# Patient Record
Sex: FEMALE | Race: WHITE | NOT HISPANIC OR LATINO | Employment: OTHER | URBAN - METROPOLITAN AREA
[De-identification: names, ages, dates, MRNs, and addresses within clinical notes are randomized per-mention and may not be internally consistent; named-entity substitution may affect disease eponyms.]

---

## 2018-03-08 ENCOUNTER — HOSPITAL ENCOUNTER (OUTPATIENT)
Dept: GENERAL RADIOLOGY | Facility: HOSPITAL | Age: 58
Discharge: HOME | End: 2018-03-08
Attending: ORTHOPAEDIC SURGERY | Admitting: ORTHOPAEDIC SURGERY

## 2018-03-08 ENCOUNTER — TRANSCRIBE ORDERS (OUTPATIENT)
Dept: ADMINISTRATIVE | Facility: HOSPITAL | Age: 58
End: 2018-03-08

## 2018-03-08 ENCOUNTER — LAB (OUTPATIENT)
Dept: LAB | Facility: HOSPITAL | Age: 58
End: 2018-03-08

## 2018-03-08 ENCOUNTER — TRANSCRIBE ORDERS (OUTPATIENT)
Dept: LAB | Facility: HOSPITAL | Age: 58
End: 2018-03-08

## 2018-03-08 DIAGNOSIS — R73.09 OTHER ABNORMAL GLUCOSE: ICD-10-CM

## 2018-03-08 DIAGNOSIS — Z87.68 PERSONAL HISTORY OF PERINATAL PROBLEMS: Primary | ICD-10-CM

## 2018-03-08 DIAGNOSIS — Z01.811 PRE-OP CHEST EXAM: ICD-10-CM

## 2018-03-08 DIAGNOSIS — Z01.811 PRE-OP CHEST EXAM: Primary | ICD-10-CM

## 2018-03-08 DIAGNOSIS — Z01.818 PREOP EXAMINATION: ICD-10-CM

## 2018-03-08 DIAGNOSIS — Z87.68 PERSONAL HISTORY OF PERINATAL PROBLEMS: ICD-10-CM

## 2018-03-08 LAB
ANION GAP SERPL CALCULATED.3IONS-SCNC: 4 MMOL/L (ref 3–11)
BUN BLD-MCNC: 24 MG/DL (ref 9–23)
BUN/CREAT SERPL: 26.7 (ref 7–25)
CALCIUM SPEC-SCNC: 9.3 MG/DL (ref 8.7–10.4)
CHLORIDE SERPL-SCNC: 110 MMOL/L (ref 99–109)
CO2 SERPL-SCNC: 27 MMOL/L (ref 20–31)
CREAT BLD-MCNC: 0.9 MG/DL (ref 0.6–1.3)
DEPRECATED RDW RBC AUTO: 48.8 FL (ref 37–54)
ERYTHROCYTE [DISTWIDTH] IN BLOOD BY AUTOMATED COUNT: 13.8 % (ref 11.3–14.5)
GFR SERPL CREATININE-BSD FRML MDRD: 64 ML/MIN/1.73
GLUCOSE BLD-MCNC: 99 MG/DL (ref 70–100)
HBA1C MFR BLD: 5.7 % (ref 4.8–5.6)
HCT VFR BLD AUTO: 41.3 % (ref 34.5–44)
HGB BLD-MCNC: 13.4 G/DL (ref 11.5–15.5)
MCH RBC QN AUTO: 31.3 PG (ref 27–31)
MCHC RBC AUTO-ENTMCNC: 32.4 G/DL (ref 32–36)
MCV RBC AUTO: 96.5 FL (ref 80–99)
PLATELET # BLD AUTO: 237 10*3/MM3 (ref 150–450)
PMV BLD AUTO: 11.1 FL (ref 6–12)
POTASSIUM BLD-SCNC: 4.7 MMOL/L (ref 3.5–5.5)
RBC # BLD AUTO: 4.28 10*6/MM3 (ref 3.89–5.14)
SODIUM BLD-SCNC: 141 MMOL/L (ref 132–146)
WBC NRBC COR # BLD: 6.34 10*3/MM3 (ref 3.5–10.8)

## 2018-03-08 PROCEDURE — 93010 ELECTROCARDIOGRAM REPORT: CPT | Performed by: INTERNAL MEDICINE

## 2018-03-08 PROCEDURE — 36415 COLL VENOUS BLD VENIPUNCTURE: CPT

## 2018-03-08 PROCEDURE — 85027 COMPLETE CBC AUTOMATED: CPT

## 2018-03-08 PROCEDURE — 93005 ELECTROCARDIOGRAM TRACING: CPT

## 2018-03-08 PROCEDURE — 83036 HEMOGLOBIN GLYCOSYLATED A1C: CPT

## 2018-03-08 PROCEDURE — 71046 X-RAY EXAM CHEST 2 VIEWS: CPT

## 2018-03-08 PROCEDURE — 80048 BASIC METABOLIC PNL TOTAL CA: CPT

## 2019-02-28 ENCOUNTER — TRANSCRIBE ORDERS (OUTPATIENT)
Dept: LAB | Facility: HOSPITAL | Age: 59
End: 2019-02-28

## 2019-02-28 ENCOUNTER — LAB (OUTPATIENT)
Dept: LAB | Facility: HOSPITAL | Age: 59
End: 2019-02-28

## 2019-02-28 DIAGNOSIS — R61 GENERALIZED HYPERHIDROSIS: Primary | ICD-10-CM

## 2019-02-28 DIAGNOSIS — R61 GENERALIZED HYPERHIDROSIS: ICD-10-CM

## 2019-02-28 LAB — TSH SERPL DL<=0.05 MIU/L-ACNC: 1.15 MIU/ML (ref 0.35–5.35)

## 2019-02-28 PROCEDURE — 36415 COLL VENOUS BLD VENIPUNCTURE: CPT

## 2019-02-28 PROCEDURE — 84443 ASSAY THYROID STIM HORMONE: CPT

## 2019-06-06 ENCOUNTER — OFFICE VISIT (OUTPATIENT)
Dept: PHYSICAL THERAPY | Facility: CLINIC | Age: 59
End: 2019-06-06

## 2019-06-06 ENCOUNTER — TRANSCRIBE ORDERS (OUTPATIENT)
Dept: PHYSICAL THERAPY | Facility: CLINIC | Age: 59
End: 2019-06-06

## 2019-06-06 DIAGNOSIS — M25.532 LEFT WRIST PAIN: Primary | ICD-10-CM

## 2019-06-06 DIAGNOSIS — M65.4 DE QUERVAIN'S SYNDROME (TENOSYNOVITIS): Primary | ICD-10-CM

## 2019-06-06 DIAGNOSIS — M65.4 TENOSYNOVITIS, DE QUERVAIN: ICD-10-CM

## 2019-06-06 PROCEDURE — L3906 WHO W/O JOINTS CF: HCPCS | Performed by: PHYSICAL THERAPIST

## 2019-06-10 NOTE — PROGRESS NOTES
Yasmin Quintin 1960   Diagnosis/ Surgery: Left DeAndrew's              Date Of Injury: Chronic    Date Of Surgery:injection today     Hand Dominance: right  History of Present Condition: Pt states that she has been dealing with pain at the base of the thumb for about 1 year. It started to get worse in April   Medical/Vocational History/ Medications: pt is retired - does gardening, working out, and housework     Pain: 7-8/10    Edema: moderate  Sensibility: WNL   Wound Status:NA  ROM/ Strength/Test: NT    Splinting:  · Patient was measure and fit with a custom fabricated forearm based thumb spica   · Patient was instructed in wearing schedule, precautions and care of the splint during this visit.   · Patient was instructed in proper donning/doffing of splint.   Assessment:  · Patient was fitted and appropriate splint was fabricated this date.  · Patient reported that splint was comfortable and had no complications with the fit of the splint.  · Patient was instructed and patient verbalized understanding of precautions, wear and care of the splint.   · Patient demonstrated independent donning/doffing of splint during treatment today.  Goals:  · Patient was fitted properly with appropriate splint for diagnosis  · Patient was educated on precautions, wear schedule and care of splint  · Patient demonstrated independence with donning/doffing of the splint.  · Splint was provided to Protect Healing Structures, Restrict Mobility, Improve joint alignment.  Plan:  · No additional treatment is required for this patient at this time. The patient is therefore discharged from therapy.  · Patient advised to contact therapist with any additional questions or concerns regarding the fit and function of the splint.  · Patient will be seen for splint issues as needed   · Wear Instructions: Off for hygiene       PT SIGNATURE: Nitin Gorman, PT, DPT,OCS, Cert DN  DATE TREATMENT INITIATED: 6/10/2019    Physician  Signature____________________________________ Date____________

## 2019-11-21 ENCOUNTER — TRANSCRIBE ORDERS (OUTPATIENT)
Dept: LAB | Facility: HOSPITAL | Age: 59
End: 2019-11-21

## 2019-11-21 ENCOUNTER — HOSPITAL ENCOUNTER (OUTPATIENT)
Dept: GENERAL RADIOLOGY | Facility: HOSPITAL | Age: 59
Discharge: HOME | End: 2019-11-21
Admitting: ORTHOPAEDIC SURGERY

## 2019-11-21 ENCOUNTER — APPOINTMENT (OUTPATIENT)
Dept: LAB | Facility: HOSPITAL | Age: 59
End: 2019-11-21

## 2019-11-21 ENCOUNTER — TRANSCRIBE ORDERS (OUTPATIENT)
Dept: ADMINISTRATIVE | Facility: HOSPITAL | Age: 59
End: 2019-11-21

## 2019-11-21 DIAGNOSIS — R73.09 IMPAIRED GLUCOSE TOLERANCE TEST: ICD-10-CM

## 2019-11-21 DIAGNOSIS — Z01.818 OTHER SPECIFIED PRE-OPERATIVE EXAMINATION: ICD-10-CM

## 2019-11-21 DIAGNOSIS — Z87.898 PERSONAL HISTORY OF OTHER LYMPHATIC AND HEMATOPOIETIC NEOPLASM: Primary | ICD-10-CM

## 2019-11-21 DIAGNOSIS — Z01.811 PRE-OP CHEST EXAM: Primary | ICD-10-CM

## 2019-11-21 DIAGNOSIS — Z01.811 PRE-OP CHEST EXAM: ICD-10-CM

## 2019-11-21 LAB
ANION GAP SERPL CALCULATED.3IONS-SCNC: 13 MMOL/L (ref 5–15)
BILIRUB UR QL STRIP: NEGATIVE
BUN BLD-MCNC: 15 MG/DL (ref 8–23)
BUN/CREAT SERPL: 16.5 (ref 7–25)
CALCIUM SPEC-SCNC: 9.6 MG/DL (ref 8.6–10.5)
CHLORIDE SERPL-SCNC: 99 MMOL/L (ref 98–107)
CLARITY UR: CLEAR
CO2 SERPL-SCNC: 24 MMOL/L (ref 22–29)
COLOR UR: YELLOW
CREAT BLD-MCNC: 0.91 MG/DL (ref 0.57–1)
DEPRECATED RDW RBC AUTO: 47.6 FL (ref 37–54)
ERYTHROCYTE [DISTWIDTH] IN BLOOD BY AUTOMATED COUNT: 12.5 % (ref 12.3–15.4)
ERYTHROCYTE [SEDIMENTATION RATE] IN BLOOD: 14 MM/HR (ref 0–30)
GFR SERPL CREATININE-BSD FRML MDRD: 63 ML/MIN/1.73
GLUCOSE BLD-MCNC: 97 MG/DL (ref 65–99)
GLUCOSE UR STRIP-MCNC: NEGATIVE MG/DL
HBA1C MFR BLD: 5.9 % (ref 4.8–5.6)
HCT VFR BLD AUTO: 44.2 % (ref 34–46.6)
HGB BLD-MCNC: 14.6 G/DL (ref 12–15.9)
HGB UR QL STRIP.AUTO: NEGATIVE
KETONES UR QL STRIP: NEGATIVE
LEUKOCYTE ESTERASE UR QL STRIP.AUTO: NEGATIVE
MCH RBC QN AUTO: 33.8 PG (ref 26.6–33)
MCHC RBC AUTO-ENTMCNC: 33 G/DL (ref 31.5–35.7)
MCV RBC AUTO: 102.3 FL (ref 79–97)
NITRITE UR QL STRIP: NEGATIVE
PH UR STRIP.AUTO: 5.5 [PH] (ref 5–8)
PLATELET # BLD AUTO: 273 10*3/MM3 (ref 140–450)
PMV BLD AUTO: 10.3 FL (ref 6–12)
POTASSIUM BLD-SCNC: 4.8 MMOL/L (ref 3.5–5.2)
PROT UR QL STRIP: NEGATIVE
RBC # BLD AUTO: 4.32 10*6/MM3 (ref 3.77–5.28)
SODIUM BLD-SCNC: 136 MMOL/L (ref 136–145)
SP GR UR STRIP: 1.01 (ref 1–1.03)
UROBILINOGEN UR QL STRIP: NORMAL
WBC NRBC COR # BLD: 12.14 10*3/MM3 (ref 3.4–10.8)

## 2019-11-21 PROCEDURE — 36415 COLL VENOUS BLD VENIPUNCTURE: CPT | Performed by: ORTHOPAEDIC SURGERY

## 2019-11-21 PROCEDURE — 85027 COMPLETE CBC AUTOMATED: CPT | Performed by: ORTHOPAEDIC SURGERY

## 2019-11-21 PROCEDURE — 83036 HEMOGLOBIN GLYCOSYLATED A1C: CPT | Performed by: ORTHOPAEDIC SURGERY

## 2019-11-21 PROCEDURE — 93005 ELECTROCARDIOGRAM TRACING: CPT | Performed by: ORTHOPAEDIC SURGERY

## 2019-11-21 PROCEDURE — 85652 RBC SED RATE AUTOMATED: CPT | Performed by: ORTHOPAEDIC SURGERY

## 2019-11-21 PROCEDURE — 93010 ELECTROCARDIOGRAM REPORT: CPT | Performed by: INTERNAL MEDICINE

## 2019-11-21 PROCEDURE — 81003 URINALYSIS AUTO W/O SCOPE: CPT | Performed by: ORTHOPAEDIC SURGERY

## 2019-11-21 PROCEDURE — 80048 BASIC METABOLIC PNL TOTAL CA: CPT | Performed by: ORTHOPAEDIC SURGERY

## 2019-11-21 PROCEDURE — 71046 X-RAY EXAM CHEST 2 VIEWS: CPT

## 2019-12-05 ENCOUNTER — APPOINTMENT (OUTPATIENT)
Dept: PREADMISSION TESTING | Facility: HOSPITAL | Age: 59
End: 2019-12-05

## 2019-12-05 VITALS — HEIGHT: 68 IN | WEIGHT: 182.2 LBS | BODY MASS INDEX: 27.61 KG/M2

## 2019-12-05 LAB
ABO GROUP BLD: NORMAL
ALBUMIN SERPL-MCNC: 4.7 G/DL (ref 3.5–5.2)
ALBUMIN/GLOB SERPL: 1.6 G/DL
ALP SERPL-CCNC: 86 U/L (ref 39–117)
ALT SERPL W P-5'-P-CCNC: 19 U/L (ref 1–33)
ANION GAP SERPL CALCULATED.3IONS-SCNC: 13 MMOL/L (ref 5–15)
AST SERPL-CCNC: 24 U/L (ref 1–32)
BACTERIA UR QL AUTO: NORMAL /HPF
BASOPHILS # BLD AUTO: 0.04 10*3/MM3 (ref 0–0.2)
BASOPHILS NFR BLD AUTO: 0.4 % (ref 0–1.5)
BILIRUB SERPL-MCNC: 0.5 MG/DL (ref 0.2–1.2)
BILIRUB UR QL STRIP: NEGATIVE
BLD GP AB SCN SERPL QL: NEGATIVE
BUN BLD-MCNC: 18 MG/DL (ref 8–23)
BUN/CREAT SERPL: 19.6 (ref 7–25)
CALCIUM SPEC-SCNC: 9.6 MG/DL (ref 8.6–10.5)
CHLORIDE SERPL-SCNC: 99 MMOL/L (ref 98–107)
CLARITY UR: CLEAR
CO2 SERPL-SCNC: 24 MMOL/L (ref 22–29)
COLOR UR: YELLOW
CREAT BLD-MCNC: 0.92 MG/DL (ref 0.57–1)
CRP SERPL-MCNC: 1.1 MG/DL (ref 0–0.5)
DEPRECATED RDW RBC AUTO: 48.1 FL (ref 37–54)
EOSINOPHIL # BLD AUTO: 0.05 10*3/MM3 (ref 0–0.4)
EOSINOPHIL NFR BLD AUTO: 0.5 % (ref 0.3–6.2)
ERYTHROCYTE [DISTWIDTH] IN BLOOD BY AUTOMATED COUNT: 12.8 % (ref 12.3–15.4)
ERYTHROCYTE [SEDIMENTATION RATE] IN BLOOD: 22 MM/HR (ref 0–30)
GFR SERPL CREATININE-BSD FRML MDRD: 62 ML/MIN/1.73
GLOBULIN UR ELPH-MCNC: 3 GM/DL
GLUCOSE BLD-MCNC: 71 MG/DL (ref 65–99)
GLUCOSE UR STRIP-MCNC: NEGATIVE MG/DL
HCT VFR BLD AUTO: 45.3 % (ref 34–46.6)
HGB BLD-MCNC: 14.8 G/DL (ref 12–15.9)
HGB UR QL STRIP.AUTO: NEGATIVE
HYALINE CASTS UR QL AUTO: NORMAL /LPF
IMM GRANULOCYTES # BLD AUTO: 0.04 10*3/MM3 (ref 0–0.05)
IMM GRANULOCYTES NFR BLD AUTO: 0.4 % (ref 0–0.5)
KETONES UR QL STRIP: ABNORMAL
LEUKOCYTE ESTERASE UR QL STRIP.AUTO: NEGATIVE
LYMPHOCYTES # BLD AUTO: 2.57 10*3/MM3 (ref 0.7–3.1)
LYMPHOCYTES NFR BLD AUTO: 25.4 % (ref 19.6–45.3)
MCH RBC QN AUTO: 33.2 PG (ref 26.6–33)
MCHC RBC AUTO-ENTMCNC: 32.7 G/DL (ref 31.5–35.7)
MCV RBC AUTO: 101.6 FL (ref 79–97)
MONOCYTES # BLD AUTO: 0.76 10*3/MM3 (ref 0.1–0.9)
MONOCYTES NFR BLD AUTO: 7.5 % (ref 5–12)
NEUTROPHILS # BLD AUTO: 6.66 10*3/MM3 (ref 1.7–7)
NEUTROPHILS NFR BLD AUTO: 65.8 % (ref 42.7–76)
NITRITE UR QL STRIP: NEGATIVE
NRBC BLD AUTO-RTO: 0 /100 WBC (ref 0–0.2)
PH UR STRIP.AUTO: 5.5 [PH] (ref 5–8)
PLATELET # BLD AUTO: 278 10*3/MM3 (ref 140–450)
PMV BLD AUTO: 9.9 FL (ref 6–12)
POTASSIUM BLD-SCNC: 4.1 MMOL/L (ref 3.5–5.2)
PROT SERPL-MCNC: 7.7 G/DL (ref 6–8.5)
PROT UR QL STRIP: NEGATIVE
RBC # BLD AUTO: 4.46 10*6/MM3 (ref 3.77–5.28)
RBC # UR: NORMAL /HPF
REF LAB TEST METHOD: NORMAL
RH BLD: POSITIVE
SODIUM BLD-SCNC: 136 MMOL/L (ref 136–145)
SP GR UR STRIP: 1.03 (ref 1–1.03)
SQUAMOUS #/AREA URNS HPF: NORMAL /HPF
UROBILINOGEN UR QL STRIP: ABNORMAL
WBC NRBC COR # BLD: 10.12 10*3/MM3 (ref 3.4–10.8)
WBC UR QL AUTO: NORMAL /HPF

## 2019-12-05 PROCEDURE — 85652 RBC SED RATE AUTOMATED: CPT | Performed by: ORTHOPAEDIC SURGERY

## 2019-12-05 PROCEDURE — 36415 COLL VENOUS BLD VENIPUNCTURE: CPT

## 2019-12-05 PROCEDURE — 80053 COMPREHEN METABOLIC PANEL: CPT | Performed by: ORTHOPAEDIC SURGERY

## 2019-12-05 PROCEDURE — 86850 RBC ANTIBODY SCREEN: CPT | Performed by: ORTHOPAEDIC SURGERY

## 2019-12-05 PROCEDURE — 85025 COMPLETE CBC W/AUTO DIFF WBC: CPT | Performed by: ORTHOPAEDIC SURGERY

## 2019-12-05 PROCEDURE — 86901 BLOOD TYPING SEROLOGIC RH(D): CPT | Performed by: ORTHOPAEDIC SURGERY

## 2019-12-05 PROCEDURE — 86140 C-REACTIVE PROTEIN: CPT | Performed by: ORTHOPAEDIC SURGERY

## 2019-12-05 PROCEDURE — 81001 URINALYSIS AUTO W/SCOPE: CPT | Performed by: ORTHOPAEDIC SURGERY

## 2019-12-05 PROCEDURE — 86900 BLOOD TYPING SEROLOGIC ABO: CPT | Performed by: ORTHOPAEDIC SURGERY

## 2019-12-05 RX ORDER — NORETHINDRONE ACETATE AND ETHINYL ESTRADIOL 1; 5 MG/1; UG/1
1 TABLET ORAL DAILY
COMMUNITY

## 2019-12-05 RX ORDER — TIZANIDINE 4 MG/1
4 TABLET ORAL NIGHTLY PRN
COMMUNITY

## 2019-12-05 RX ORDER — TRAZODONE HYDROCHLORIDE 100 MG/1
100 TABLET ORAL NIGHTLY
COMMUNITY

## 2019-12-05 RX ORDER — LANOLIN ALCOHOL/MO/W.PET/CERES
3 CREAM (GRAM) TOPICAL NIGHTLY PRN
COMMUNITY

## 2019-12-05 RX ORDER — ESCITALOPRAM OXALATE 10 MG/1
10 TABLET ORAL EVERY MORNING
COMMUNITY

## 2019-12-05 RX ORDER — PHENOL 1.4 %
600 AEROSOL, SPRAY (ML) MUCOUS MEMBRANE DAILY
COMMUNITY

## 2019-12-05 RX ORDER — OMEPRAZOLE 40 MG/1
40 CAPSULE, DELAYED RELEASE ORAL EVERY OTHER DAY
COMMUNITY

## 2019-12-05 RX ORDER — VARENICLINE TARTRATE 1 MG/1
1 TABLET, FILM COATED ORAL 2 TIMES DAILY
COMMUNITY

## 2019-12-05 ASSESSMENT — HOOS JR
HOOS JR SCORE: 43.335
HOOS JR SCORE: 15

## 2019-12-05 NOTE — PAT
Patient instructed to drink 20 ounces (or until full) of Gatorade and it needs to be completed 1 hour before given arrival time for procedure (NO RED Gatorade)    Patient verbalized understanding.    Patient to apply Chlorhexadine wipes  to surgical area (as instructed) the night before procedure and the AM of procedure. Wipes provided.    Patient attended joint replacement class today during PAT visit.    Patient scored 5 out of 5 on memory screen without difficulty.    EKG and CXR 11/21/19  HA1c 11/21/19 5.90    Too early to draw type and screen.  Obtain in preop.

## 2019-12-10 ENCOUNTER — ANESTHESIA EVENT (OUTPATIENT)
Dept: PERIOP | Facility: HOSPITAL | Age: 59
End: 2019-12-10

## 2019-12-10 RX ORDER — SODIUM CHLORIDE 0.9 % (FLUSH) 0.9 %
10 SYRINGE (ML) INJECTION EVERY 12 HOURS SCHEDULED
Status: CANCELLED | OUTPATIENT
Start: 2019-12-10

## 2019-12-10 RX ORDER — FAMOTIDINE 10 MG/ML
20 INJECTION, SOLUTION INTRAVENOUS ONCE
Status: CANCELLED | OUTPATIENT
Start: 2019-12-10 | End: 2019-12-10

## 2019-12-10 RX ORDER — SODIUM CHLORIDE 0.9 % (FLUSH) 0.9 %
10 SYRINGE (ML) INJECTION AS NEEDED
Status: CANCELLED | OUTPATIENT
Start: 2019-12-10

## 2019-12-11 ENCOUNTER — HOSPITAL ENCOUNTER (INPATIENT)
Facility: HOSPITAL | Age: 59
LOS: 1 days | Discharge: HOME HEALTH | End: 2019-12-12
Attending: ORTHOPAEDIC SURGERY | Admitting: ORTHOPAEDIC SURGERY

## 2019-12-11 ENCOUNTER — APPOINTMENT (OUTPATIENT)
Dept: GENERAL RADIOLOGY | Facility: HOSPITAL | Age: 59
End: 2019-12-11

## 2019-12-11 ENCOUNTER — ANESTHESIA (OUTPATIENT)
Dept: PERIOP | Facility: HOSPITAL | Age: 59
End: 2019-12-11

## 2019-12-11 DIAGNOSIS — Z96.641 STATUS POST TOTAL REPLACEMENT OF RIGHT HIP: Primary | ICD-10-CM

## 2019-12-11 PROBLEM — Z72.0 TOBACCO USE: Status: ACTIVE | Noted: 2019-12-11

## 2019-12-11 PROBLEM — M16.11 ARTHRITIS OF RIGHT HIP: Status: ACTIVE | Noted: 2019-12-11

## 2019-12-11 PROBLEM — R73.03 PREDIABETES: Status: ACTIVE | Noted: 2019-12-11

## 2019-12-11 LAB
ABO GROUP BLD: NORMAL
BLD GP AB SCN SERPL QL: NEGATIVE
GLUCOSE BLDC GLUCOMTR-MCNC: 116 MG/DL (ref 70–130)
GLUCOSE BLDC GLUCOMTR-MCNC: 231 MG/DL (ref 70–130)
GLUCOSE BLDC GLUCOMTR-MCNC: 250 MG/DL (ref 70–130)
RH BLD: POSITIVE
T&S EXPIRATION DATE: NORMAL

## 2019-12-11 PROCEDURE — 25010000002 ONDANSETRON PER 1 MG: Performed by: NURSE ANESTHETIST, CERTIFIED REGISTERED

## 2019-12-11 PROCEDURE — 63710000001 INSULIN LISPRO (HUMAN) PER 5 UNITS: Performed by: NURSE PRACTITIONER

## 2019-12-11 PROCEDURE — 25010000003 CEFAZOLIN IN DEXTROSE 2-4 GM/100ML-% SOLUTION: Performed by: ORTHOPAEDIC SURGERY

## 2019-12-11 PROCEDURE — 82962 GLUCOSE BLOOD TEST: CPT

## 2019-12-11 PROCEDURE — C1755 CATHETER, INTRASPINAL: HCPCS | Performed by: ORTHOPAEDIC SURGERY

## 2019-12-11 PROCEDURE — 86923 COMPATIBILITY TEST ELECTRIC: CPT

## 2019-12-11 PROCEDURE — 97161 PT EVAL LOW COMPLEX 20 MIN: CPT

## 2019-12-11 PROCEDURE — 73502 X-RAY EXAM HIP UNI 2-3 VIEWS: CPT

## 2019-12-11 PROCEDURE — 25010000002 HYDROMORPHONE PER 4 MG: Performed by: ORTHOPAEDIC SURGERY

## 2019-12-11 PROCEDURE — C1776 JOINT DEVICE (IMPLANTABLE): HCPCS | Performed by: ORTHOPAEDIC SURGERY

## 2019-12-11 PROCEDURE — 25010000002 FENTANYL CITRATE (PF) 100 MCG/2ML SOLUTION: Performed by: NURSE ANESTHETIST, CERTIFIED REGISTERED

## 2019-12-11 PROCEDURE — 25010000002 ROPIVACAINE PER 1 MG: Performed by: ORTHOPAEDIC SURGERY

## 2019-12-11 PROCEDURE — 86901 BLOOD TYPING SEROLOGIC RH(D): CPT | Performed by: ORTHOPAEDIC SURGERY

## 2019-12-11 PROCEDURE — 86900 BLOOD TYPING SEROLOGIC ABO: CPT | Performed by: ORTHOPAEDIC SURGERY

## 2019-12-11 PROCEDURE — 25010000002 PROPOFOL 10 MG/ML EMULSION: Performed by: NURSE ANESTHETIST, CERTIFIED REGISTERED

## 2019-12-11 PROCEDURE — 76000 FLUOROSCOPY <1 HR PHYS/QHP: CPT

## 2019-12-11 PROCEDURE — 25010000002 PHENYLEPHRINE PER 1 ML: Performed by: NURSE ANESTHETIST, CERTIFIED REGISTERED

## 2019-12-11 PROCEDURE — 25010000002 DEXAMETHASONE PER 1 MG: Performed by: NURSE ANESTHETIST, CERTIFIED REGISTERED

## 2019-12-11 PROCEDURE — 86850 RBC ANTIBODY SCREEN: CPT | Performed by: ORTHOPAEDIC SURGERY

## 2019-12-11 PROCEDURE — 0SR906A REPLACEMENT OF RIGHT HIP JOINT WITH OXIDIZED ZIRCONIUM ON POLYETHYLENE SYNTHETIC SUBSTITUTE, UNCEMENTED, OPEN APPROACH: ICD-10-PCS | Performed by: ORTHOPAEDIC SURGERY

## 2019-12-11 PROCEDURE — 97116 GAIT TRAINING THERAPY: CPT

## 2019-12-11 DEVICE — POLARSTEM COLLAR STANDARD                                    NON-CEMENTED WITH TI/HA 3
Type: IMPLANTABLE DEVICE | Site: HIP | Status: FUNCTIONAL
Brand: POLARSTEM

## 2019-12-11 DEVICE — REFLECTION SPHERICAL HEAD SCREW 25MM
Type: IMPLANTABLE DEVICE | Site: HIP | Status: FUNCTIONAL
Brand: REFLECTION

## 2019-12-11 DEVICE — R3 0 DEGREE XLPE ACETABULAR LINER                                    36MM INNER DIAMETER X OUTER DIAMETER 56MM
Type: IMPLANTABLE DEVICE | Site: HIP | Status: FUNCTIONAL
Brand: R3

## 2019-12-11 DEVICE — IMPLANTABLE DEVICE: Type: IMPLANTABLE DEVICE | Site: HIP | Status: FUNCTIONAL

## 2019-12-11 DEVICE — OXINIUM FEMORAL HEAD 12/14 TAPER                                    36 MM +0
Type: IMPLANTABLE DEVICE | Site: HIP | Status: FUNCTIONAL
Brand: OXINIUM

## 2019-12-11 DEVICE — R3 3 HOLE ACETABULAR SHELL 56MM
Type: IMPLANTABLE DEVICE | Site: HIP | Status: FUNCTIONAL
Brand: R3 ACETABULAR

## 2019-12-11 DEVICE — REFLECTION SPHERICAL HEAD SCREW 20MM
Type: IMPLANTABLE DEVICE | Site: HIP | Status: FUNCTIONAL
Brand: REFLECTION

## 2019-12-11 RX ORDER — VARENICLINE TARTRATE 0.5 MG/1
1 TABLET, FILM COATED ORAL 2 TIMES DAILY WITH MEALS
Status: DISCONTINUED | OUTPATIENT
Start: 2019-12-11 | End: 2019-12-12 | Stop reason: HOSPADM

## 2019-12-11 RX ORDER — NICOTINE POLACRILEX 4 MG
15 LOZENGE BUCCAL
Status: DISCONTINUED | OUTPATIENT
Start: 2019-12-11 | End: 2019-12-12 | Stop reason: HOSPADM

## 2019-12-11 RX ORDER — DEXTROSE MONOHYDRATE 25 G/50ML
25 INJECTION, SOLUTION INTRAVENOUS
Status: DISCONTINUED | OUTPATIENT
Start: 2019-12-11 | End: 2019-12-12 | Stop reason: HOSPADM

## 2019-12-11 RX ORDER — HYDROCODONE BITARTRATE AND ACETAMINOPHEN 5; 325 MG/1; MG/1
1 TABLET ORAL EVERY 4 HOURS PRN
Status: DISCONTINUED | OUTPATIENT
Start: 2019-12-11 | End: 2019-12-12 | Stop reason: HOSPADM

## 2019-12-11 RX ORDER — FAMOTIDINE 20 MG/1
20 TABLET, FILM COATED ORAL ONCE
Status: COMPLETED | OUTPATIENT
Start: 2019-12-11 | End: 2019-12-11

## 2019-12-11 RX ORDER — BUPIVACAINE HYDROCHLORIDE 5 MG/ML
INJECTION, SOLUTION PERINEURAL
Status: COMPLETED | OUTPATIENT
Start: 2019-12-11 | End: 2019-12-11

## 2019-12-11 RX ORDER — ONDANSETRON 2 MG/ML
4 INJECTION INTRAMUSCULAR; INTRAVENOUS EVERY 6 HOURS PRN
Status: DISCONTINUED | OUTPATIENT
Start: 2019-12-11 | End: 2019-12-12 | Stop reason: HOSPADM

## 2019-12-11 RX ORDER — MAGNESIUM HYDROXIDE 1200 MG/15ML
LIQUID ORAL AS NEEDED
Status: DISCONTINUED | OUTPATIENT
Start: 2019-12-11 | End: 2019-12-11 | Stop reason: HOSPADM

## 2019-12-11 RX ORDER — DEXAMETHASONE SODIUM PHOSPHATE 4 MG/ML
INJECTION, SOLUTION INTRA-ARTICULAR; INTRALESIONAL; INTRAMUSCULAR; INTRAVENOUS; SOFT TISSUE AS NEEDED
Status: DISCONTINUED | OUTPATIENT
Start: 2019-12-11 | End: 2019-12-11 | Stop reason: SURG

## 2019-12-11 RX ORDER — PREGABALIN 75 MG/1
75 CAPSULE ORAL ONCE
Status: COMPLETED | OUTPATIENT
Start: 2019-12-11 | End: 2019-12-11

## 2019-12-11 RX ORDER — TIZANIDINE 4 MG/1
4 TABLET ORAL NIGHTLY PRN
Status: DISCONTINUED | OUTPATIENT
Start: 2019-12-11 | End: 2019-12-12 | Stop reason: HOSPADM

## 2019-12-11 RX ORDER — TRAZODONE HYDROCHLORIDE 100 MG/1
100 TABLET ORAL NIGHTLY
Status: DISCONTINUED | OUTPATIENT
Start: 2019-12-11 | End: 2019-12-12 | Stop reason: HOSPADM

## 2019-12-11 RX ORDER — CEFAZOLIN SODIUM 2 G/100ML
2 INJECTION, SOLUTION INTRAVENOUS ONCE
Status: COMPLETED | OUTPATIENT
Start: 2019-12-11 | End: 2019-12-11

## 2019-12-11 RX ORDER — PANTOPRAZOLE SODIUM 40 MG/1
40 TABLET, DELAYED RELEASE ORAL EVERY MORNING
Status: DISCONTINUED | OUTPATIENT
Start: 2019-12-12 | End: 2019-12-12 | Stop reason: HOSPADM

## 2019-12-11 RX ORDER — CEFAZOLIN SODIUM 2 G/100ML
2 INJECTION, SOLUTION INTRAVENOUS EVERY 8 HOURS
Status: COMPLETED | OUTPATIENT
Start: 2019-12-11 | End: 2019-12-12

## 2019-12-11 RX ORDER — KETOROLAC TROMETHAMINE 30 MG/ML
15 INJECTION, SOLUTION INTRAMUSCULAR; INTRAVENOUS EVERY 6 HOURS PRN
Status: DISCONTINUED | OUTPATIENT
Start: 2019-12-11 | End: 2019-12-12 | Stop reason: HOSPADM

## 2019-12-11 RX ORDER — ONDANSETRON 2 MG/ML
INJECTION INTRAMUSCULAR; INTRAVENOUS AS NEEDED
Status: DISCONTINUED | OUTPATIENT
Start: 2019-12-11 | End: 2019-12-11 | Stop reason: SURG

## 2019-12-11 RX ORDER — LABETALOL HYDROCHLORIDE 5 MG/ML
10 INJECTION, SOLUTION INTRAVENOUS EVERY 4 HOURS PRN
Status: DISCONTINUED | OUTPATIENT
Start: 2019-12-11 | End: 2019-12-12 | Stop reason: HOSPADM

## 2019-12-11 RX ORDER — SODIUM CHLORIDE 9 MG/ML
150 INJECTION, SOLUTION INTRAVENOUS CONTINUOUS
Status: DISCONTINUED | OUTPATIENT
Start: 2019-12-11 | End: 2019-12-12 | Stop reason: HOSPADM

## 2019-12-11 RX ORDER — ESCITALOPRAM OXALATE 10 MG/1
10 TABLET ORAL EVERY MORNING
Status: DISCONTINUED | OUTPATIENT
Start: 2019-12-12 | End: 2019-12-12 | Stop reason: HOSPADM

## 2019-12-11 RX ORDER — FENTANYL CITRATE 50 UG/ML
50 INJECTION, SOLUTION INTRAMUSCULAR; INTRAVENOUS
Status: DISCONTINUED | OUTPATIENT
Start: 2019-12-11 | End: 2019-12-11 | Stop reason: HOSPADM

## 2019-12-11 RX ORDER — HYDROMORPHONE HYDROCHLORIDE 1 MG/ML
0.5 INJECTION, SOLUTION INTRAMUSCULAR; INTRAVENOUS; SUBCUTANEOUS
Status: DISCONTINUED | OUTPATIENT
Start: 2019-12-11 | End: 2019-12-12 | Stop reason: HOSPADM

## 2019-12-11 RX ORDER — DOCUSATE SODIUM 100 MG/1
100 CAPSULE, LIQUID FILLED ORAL 2 TIMES DAILY
Status: DISCONTINUED | OUTPATIENT
Start: 2019-12-11 | End: 2019-12-12 | Stop reason: HOSPADM

## 2019-12-11 RX ORDER — LIDOCAINE HYDROCHLORIDE 10 MG/ML
0.5 INJECTION, SOLUTION EPIDURAL; INFILTRATION; INTRACAUDAL; PERINEURAL ONCE AS NEEDED
Status: COMPLETED | OUTPATIENT
Start: 2019-12-11 | End: 2019-12-11

## 2019-12-11 RX ORDER — SODIUM CHLORIDE, SODIUM LACTATE, POTASSIUM CHLORIDE, CALCIUM CHLORIDE 600; 310; 30; 20 MG/100ML; MG/100ML; MG/100ML; MG/100ML
9 INJECTION, SOLUTION INTRAVENOUS CONTINUOUS
Status: DISCONTINUED | OUTPATIENT
Start: 2019-12-11 | End: 2019-12-12 | Stop reason: HOSPADM

## 2019-12-11 RX ORDER — TRANEXAMIC ACID 100 MG/ML
INJECTION, SOLUTION INTRAVENOUS AS NEEDED
Status: DISCONTINUED | OUTPATIENT
Start: 2019-12-11 | End: 2019-12-11 | Stop reason: SURG

## 2019-12-11 RX ORDER — LANOLIN ALCOHOL/MO/W.PET/CERES
3 CREAM (GRAM) TOPICAL NIGHTLY PRN
Status: DISCONTINUED | OUTPATIENT
Start: 2019-12-11 | End: 2019-12-12 | Stop reason: HOSPADM

## 2019-12-11 RX ORDER — ASPIRIN 325 MG
325 TABLET ORAL DAILY
Status: DISCONTINUED | OUTPATIENT
Start: 2019-12-12 | End: 2019-12-12 | Stop reason: HOSPADM

## 2019-12-11 RX ORDER — ROPIVACAINE HYDROCHLORIDE 5 MG/ML
INJECTION, SOLUTION EPIDURAL; INFILTRATION; PERINEURAL AS NEEDED
Status: DISCONTINUED | OUTPATIENT
Start: 2019-12-11 | End: 2019-12-11 | Stop reason: HOSPADM

## 2019-12-11 RX ORDER — PROMETHAZINE HYDROCHLORIDE 12.5 MG/1
12.5 TABLET ORAL EVERY 6 HOURS PRN
Status: DISCONTINUED | OUTPATIENT
Start: 2019-12-11 | End: 2019-12-12 | Stop reason: HOSPADM

## 2019-12-11 RX ORDER — NALOXONE HCL 0.4 MG/ML
0.1 VIAL (ML) INJECTION
Status: DISCONTINUED | OUTPATIENT
Start: 2019-12-11 | End: 2019-12-12 | Stop reason: HOSPADM

## 2019-12-11 RX ORDER — BISACODYL 10 MG
10 SUPPOSITORY, RECTAL RECTAL DAILY PRN
Status: DISCONTINUED | OUTPATIENT
Start: 2019-12-11 | End: 2019-12-12 | Stop reason: HOSPADM

## 2019-12-11 RX ORDER — BISACODYL 5 MG/1
10 TABLET, DELAYED RELEASE ORAL DAILY PRN
Status: DISCONTINUED | OUTPATIENT
Start: 2019-12-11 | End: 2019-12-12 | Stop reason: HOSPADM

## 2019-12-11 RX ORDER — ONDANSETRON 2 MG/ML
4 INJECTION INTRAMUSCULAR; INTRAVENOUS ONCE AS NEEDED
Status: DISCONTINUED | OUTPATIENT
Start: 2019-12-11 | End: 2019-12-11 | Stop reason: HOSPADM

## 2019-12-11 RX ADMIN — VARENICLINE TARTRATE 1 MG: 0.5 TABLET, FILM COATED ORAL at 17:26

## 2019-12-11 RX ADMIN — PHENYLEPHRINE HYDROCHLORIDE 100 MCG: 10 INJECTION INTRAVENOUS at 10:56

## 2019-12-11 RX ADMIN — TRANEXAMIC ACID 1000 MG: 100 INJECTION, SOLUTION INTRAVENOUS at 09:59

## 2019-12-11 RX ADMIN — DEXAMETHASONE SODIUM PHOSPHATE 8 MG: 4 INJECTION, SOLUTION INTRAMUSCULAR; INTRAVENOUS at 11:03

## 2019-12-11 RX ADMIN — DOCUSATE SODIUM 100 MG: 100 CAPSULE, LIQUID FILLED ORAL at 21:11

## 2019-12-11 RX ADMIN — HYDROMORPHONE HYDROCHLORIDE 0.5 MG: 1 INJECTION, SOLUTION INTRAMUSCULAR; INTRAVENOUS; SUBCUTANEOUS at 17:26

## 2019-12-11 RX ADMIN — HYDROCODONE BITARTRATE AND ACETAMINOPHEN 1 TABLET: 5; 325 TABLET ORAL at 19:11

## 2019-12-11 RX ADMIN — PHENYLEPHRINE HYDROCHLORIDE 100 MCG: 10 INJECTION INTRAVENOUS at 10:07

## 2019-12-11 RX ADMIN — EPHEDRINE SULFATE 15 MG: 50 INJECTION INTRAMUSCULAR; INTRAVENOUS; SUBCUTANEOUS at 11:29

## 2019-12-11 RX ADMIN — PHENYLEPHRINE HYDROCHLORIDE 100 MCG: 10 INJECTION INTRAVENOUS at 10:48

## 2019-12-11 RX ADMIN — SODIUM CHLORIDE 150 ML/HR: 9 INJECTION, SOLUTION INTRAVENOUS at 16:40

## 2019-12-11 RX ADMIN — MUPIROCIN 1 APPLICATION: 20 OINTMENT TOPICAL at 08:04

## 2019-12-11 RX ADMIN — ONDANSETRON 4 MG: 2 INJECTION INTRAMUSCULAR; INTRAVENOUS at 11:03

## 2019-12-11 RX ADMIN — TRANEXAMIC ACID 1000 MG: 100 INJECTION, SOLUTION INTRAVENOUS at 11:43

## 2019-12-11 RX ADMIN — PHENYLEPHRINE HYDROCHLORIDE 100 MCG: 10 INJECTION INTRAVENOUS at 11:06

## 2019-12-11 RX ADMIN — PREGABALIN 75 MG: 75 CAPSULE ORAL at 08:02

## 2019-12-11 RX ADMIN — FAMOTIDINE 20 MG: 20 TABLET ORAL at 08:02

## 2019-12-11 RX ADMIN — FENTANYL CITRATE 50 MCG: 0.05 INJECTION, SOLUTION INTRAMUSCULAR; INTRAVENOUS at 13:19

## 2019-12-11 RX ADMIN — PHENYLEPHRINE HYDROCHLORIDE 100 MCG: 10 INJECTION INTRAVENOUS at 11:23

## 2019-12-11 RX ADMIN — SODIUM CHLORIDE, POTASSIUM CHLORIDE, SODIUM LACTATE AND CALCIUM CHLORIDE: 600; 310; 30; 20 INJECTION, SOLUTION INTRAVENOUS at 11:28

## 2019-12-11 RX ADMIN — HYDROCODONE BITARTRATE AND ACETAMINOPHEN 1 TABLET: 5; 325 TABLET ORAL at 15:29

## 2019-12-11 RX ADMIN — FENTANYL CITRATE 50 MCG: 0.05 INJECTION, SOLUTION INTRAMUSCULAR; INTRAVENOUS at 13:02

## 2019-12-11 RX ADMIN — CEFAZOLIN SODIUM 2 G: 2 INJECTION, SOLUTION INTRAVENOUS at 17:26

## 2019-12-11 RX ADMIN — LIDOCAINE HYDROCHLORIDE 0.2 ML: 10 INJECTION, SOLUTION EPIDURAL; INFILTRATION; INTRACAUDAL; PERINEURAL at 07:50

## 2019-12-11 RX ADMIN — SODIUM CHLORIDE, POTASSIUM CHLORIDE, SODIUM LACTATE AND CALCIUM CHLORIDE 9 ML/HR: 600; 310; 30; 20 INJECTION, SOLUTION INTRAVENOUS at 07:50

## 2019-12-11 RX ADMIN — TRAZODONE HYDROCHLORIDE 100 MG: 100 TABLET ORAL at 21:11

## 2019-12-11 RX ADMIN — PROPOFOL 120 MCG/KG/MIN: 10 INJECTION, EMULSION INTRAVENOUS at 10:03

## 2019-12-11 RX ADMIN — CEFAZOLIN SODIUM 2 G: 2 INJECTION, SOLUTION INTRAVENOUS at 09:58

## 2019-12-11 RX ADMIN — BUPIVACAINE HYDROCHLORIDE 1.8 ML: 5 INJECTION, SOLUTION PERINEURAL at 09:57

## 2019-12-11 RX ADMIN — FENTANYL CITRATE 50 MCG: 0.05 INJECTION, SOLUTION INTRAMUSCULAR; INTRAVENOUS at 14:17

## 2019-12-11 RX ADMIN — FENTANYL CITRATE 50 MCG: 0.05 INJECTION, SOLUTION INTRAMUSCULAR; INTRAVENOUS at 14:33

## 2019-12-11 NOTE — ANESTHESIA PREPROCEDURE EVALUATION
Anesthesia Evaluation     Patient summary reviewed and Nursing notes reviewed   no history of anesthetic complications:  NPO Solid Status: > 8 hours  NPO Liquid Status: > 2 hours           Airway   Mallampati: II  TM distance: >3 FB  Neck ROM: full  No difficulty expected  Dental - normal exam     Pulmonary - normal exam    breath sounds clear to auscultation  (+) a smoker (quit x 4 wks),   Cardiovascular - negative cardio ROS and normal exam    Rhythm: regular  Rate: normal        Neuro/Psych- negative ROS  GI/Hepatic/Renal/Endo - negative ROS     Musculoskeletal     (+) arthralgias,   Abdominal    Substance History      OB/GYN          Other   arthritis,                      Anesthesia Plan    ASA 2     general and spinal     intravenous induction     Anesthetic plan, all risks, benefits, and alternatives have been provided, discussed and informed consent has been obtained with: patient.    Plan discussed with CRNA.

## 2019-12-11 NOTE — PLAN OF CARE
Problem: Patient Care Overview  Goal: Plan of Care Review  Flowsheets (Taken 12/11/2019 0842)  Progress: improving  Plan of Care Reviewed With: patient  Outcome Summary: Pt ambulated 140 feet with CGA x2 and front-wheeled walker. Gait limited by fatigue and pain. No knee buckling noted during ambulation. STS requiring CGA x2. Reviewed HEP and hip precautions. Will progress to stair training POD#1. Pt also states she will be staying with her sister for the first few weeks upon d/c. PADD score = 9. Anticipate pt d/c home with assist and HHPT.

## 2019-12-11 NOTE — ANESTHESIA POSTPROCEDURE EVALUATION
Patient: Yasmin Ribeiro    Procedure Summary     Date:  12/11/19 [facility] / [facility]:   [facility]    Anesthesia Start:  0950 Anesthesia Stop:  1202    Procedure:  TOTAL HIP ARTHROPLASTY ANTERIOR RIGHT (Right Hip) Diagnosis:      Surgeon:  John Barrios MD Provider:  Chase Washington MD    Anesthesia Type:  general, spinal ASA Status:  2          Anesthesia Type: general, spinal    Vitals  Vitals Value Taken Time   /85 12/11/2019 12:00 PM   Temp 97.2 °F (36.2 °C) 12/11/2019 12:00 PM   Pulse 72 12/11/2019 12:00 PM   Resp 16 12/11/2019 12:00 PM   SpO2 100 % 12/11/2019 12:00 PM           Anesthesia Post Evaluation

## 2019-12-11 NOTE — H&P
Patient Name: Yasmin Ribeiro  MRN: [id number]  : 1960  DOS: 2019    Attending: John Barrios MD    Primary Care Provider: Danny Cortez MD      Chief complaint:  Right hip pain    Subjective   Patient is a 58 y.o. female presented for right total hip arthroplasty by Dr. Barrios.    She underwent surgery under GA. She tolerated surgery well and is admitted for further medical management.  Her hip has been painful for many years, severe for the last 6 months.  She denies use of assistive device for ambulation or recent falls.    When seen postop she is doing well.  Her pain is well controlled.  She denies nausea, shortness of breath or chest pain.  No history of DVT or PE.      (Above is noted, agree.  Doing very well once in her room afterwards.  Was seen already by physical therapy and ambulated 140 feet with contact-guard assist of 2 with a front wheel walker.  Had some fatigue and pain at the time.  Her pain is controlled currently.  No nausea, vomiting, or shortness breath.)wy    Allergies:  No Known Allergies    Meds:  Medications Prior to Admission   Medication Sig Dispense Refill Last Dose   • escitalopram (LEXAPRO) 10 MG tablet Take 10 mg by mouth Every Morning.   12/10/2019 at Unknown time   • Lido-Menthol-Methyl Sal-Camph (CBD KINGS EX) Apply 1 dose topically As Needed.   12/10/2019 at Unknown time   • melatonin 3 MG tablet Take 3 mg by mouth At Night As Needed for Sleep.   12/10/2019 at Unknown time   • norethindrone-ethinyl estradiol (FEMHRT ) 1-5 MG-MCG tablet Take 1 tablet by mouth Daily.   12/10/2019 at Unknown time   • omeprazole (priLOSEC) 40 MG capsule Take 40 mg by mouth Every Other Day.   12/10/2019 at Unknown time   • Plecanatide (TRULANCE) 3 MG tablet Take 1 tablet by mouth Every Morning.   12/10/2019 at Unknown time   • traZODone (DESYREL) 100 MG tablet Take 100 mg by mouth Every Night.   12/10/2019 at Unknown time   • varenicline (CHANTIX) 1 MG tablet Take 1 mg  by mouth 2 (Two) Times a Day.   12/10/2019 at Unknown time   • calcium carbonate (OS-KEZIA) 600 MG tablet Take 600 mg by mouth Daily.   12/9/2019   • tiZANidine (ZANAFLEX) 4 MG tablet Take 4 mg by mouth At Night As Needed for Muscle Spasms.   12/8/2019       History:   Past Medical History:   Diagnosis Date   • Chronic night sweats    • GERD (gastroesophageal reflux disease)      Past Surgical History:   Procedure Laterality Date   • COLONOSCOPY     • ROTATOR CUFF REPAIR Right    • TUBAL ABDOMINAL LIGATION       History reviewed. No pertinent family history.  Social History     Tobacco Use   • Smoking status: Former Smoker     Packs/day: 1.00     Years: 10.00     Pack years: 10.00     Types: Cigarettes   • Smokeless tobacco: Never Used   • Tobacco comment: off and on for 10 years    Substance Use Topics   • Alcohol use: No     Frequency: Never   • Drug use: No   She lives alone and has 2 children.  She is a retired teacher.    Review of Systems  All systems were reviewed and negative except for:  Gastrointestinal: positive for  constipation    Vital Signs  Visit Vitals  /96   Pulse 89   Temp 97.9 °F (36.6 °C) (Axillary)   Resp 16   SpO2 96%   Breastfeeding No       Physical Exam:    General Appearance:    Alert, cooperative, in no acute distress   Head:    Normocephalic, without obvious abnormality, atraumatic   Eyes:            Lids and lashes normal, conjunctivae and sclerae normal, no   icterus, no pallor, corneas clear   Ears:    Ears appear intact with no abnormalities noted   Neck:   No adenopathy, supple, trachea midline, no thyromegaly   Lungs:     Clear to auscultation, respirations regular, even and                   unlabored    Heart:    Regular rhythm and normal rate, normal S1 and S2, no            murmur, no gallop   Abdomen:     Normal bowel sounds, no masses, no organomegaly, soft        non-tender, non-distended, no guarding, no rebound                 tenderness   Genitalia:    Deferred    Extremities:  Right hip roverto dressing clean dry intact.   Pulses:   Pulses palpable and equal bilaterally   Skin:   No bleeding, bruising or rash   Neurologic:   Cranial nerves 2 - 12 grossly intact, sensation intact. Flexion and dorsiflexion intact bilateral feet.        I reviewed the patient's new clinical results.       Results from last 7 days   Lab Units 12/05/19  1042   WBC 10*3/mm3 10.12   HEMOGLOBIN g/dL 14.8   HEMATOCRIT % 45.3   PLATELETS 10*3/mm3 278     Results from last 7 days   Lab Units 12/05/19  1042   SODIUM mmol/L 136   POTASSIUM mmol/L 4.1   CHLORIDE mmol/L 99   CO2 mmol/L 24.0   BUN mg/dL 18   CREATININE mg/dL 0.92   CALCIUM mg/dL 9.6   BILIRUBIN mg/dL 0.5   ALK PHOS U/L 86   ALT (SGPT) U/L 19   AST (SGOT) U/L 24   GLUCOSE mg/dL 71     Lab Results   Component Value Date    HGBA1C 5.90 (H) 11/21/2019       Assessment and Plan:     Status post total replacement of right hip    Arthritis of right hip    Prediabetes    Tobacco use      Plan  1. PT/OT- early ambulation postop  2. Pain control-prns   3. IS-encourage  4. DVT proph- mechs/ASA  5. Bowel regimen  6. Resume home medications as appropriate  7. Monitor post-op labs  8. Discharge planning for home(plans to stay with her sister post discharge for a while due to her house having many stairs)wy    PreDM  - hgb A1c on 11/21/19 5.9  - Accu-Chek AC and HS with low dose SSI    Tobacco  - continue home chantix      HARDIK Cage  12/11/19  4:08 PM     I have personally performed the evaluation on this patient. My history is consistent  with HPI obtained. My exam findings are listed above. I have personally reviewed and discussed the above formulated treatment plan with pt and AH. HARDIK.wy

## 2019-12-11 NOTE — PLAN OF CARE
Patient arrived on unit around 1520. Vital signs stable and patient reporting pain at 7/10. PRN pain medicine given and patient working with PT. Will continue to monitor.

## 2019-12-11 NOTE — OP NOTE
TOTAL HIP ARTHROPLASTY ANTERIOR  Progress Note    Yasmin Ribeiro  12/11/2019    Pre-op Diagnosis:   Right hip osteoarthritis       Post-Op Diagnosis Codes:  Right hip osteoarthritis    Procedure/CPT® Codes:  27731    Procedure(s):  TOTAL HIP ARTHROPLASTY ANTERIOR RIGHT    Surgeon(s):  John Barrios MD    Anesthesia: Spinal    Staff:   Circulator: Shazia Dickerson RN; Karlo Santillan RN  Radiology Technologist: Clyde Trevizo  Scrub Person: Xena Florian; Garcia Haywood  Vendor Representative: Eliezer Hoffmann  Nursing Assistant: Valarie Osorio  Assistant: Saranya Heredia PA    Estimated Blood Loss: 300 mL    Urine Voided: 0 mL    Specimens:                None          Drains: * No LDAs found *    Findings: Complete loss superior weightbearing cartilage    Complications: None      Implants: [employer] R3 56 acetabular cup; screw x 3; N/N polyethylene liner             Polarstem press-fit femur size 3 KA with +0/36 neck/head adapter      Indications:  58-year-old woman with end-stage right hip osteoarthritis symptoms. X-rays show complete loss superior lateral joint space. Pain is limiting routine daily activities. Risks benefits indications and rationale for total hip arthroplasty discussed at length with patient. She signs her own consent after all questions are answered.      Procedure:  While in the OR spinal anesthetic achieved with satisfactory level. Turned to the supine position and prepped and draped in standard fashion for anterior approach to the left hip on the [place] table. Fluoroscopy used to assess limb lengths. These were restored with final component selection and position. Incision made over the tensor fascia milli and routine dissection carried down to raise the fascia over the medial margin of the tensor muscle belly. Circumflex vessels were identified and cauterized. Bleeding reasonably controlled with estimated total blood loss 300 mL. Femoral neck isolated. T-shaped capsulotomy  created. Mild effusion relieved. Standard neck cut made and head removed without difficulty noting superior cartilage irregularities. Acetabulum was exposed circumferentially. Acetabulum reamed from size 45 to size 56 noting satisfactory exposure of the subchondral bone with the size 56. Size 56 final component was seated with excellent press-fit characteristics. No additional fixation screws were required. Polyethylene insert placed without difficulty. Horizontal tilt was thought to be approximately 35 to 40° from Hilgenreiner's line. Anteversion approximately 20-25°. Component was under the anterior inferior acetabular margin. Acetabulum was thoroughly irrigated before during and after component placement.      Attention turned to the proximal femur. Trochanteric hook placed and hip externally rotated eventually to 160° after complete posterior lateral releases. Standard piriformis landmarks were used. Broached from size 0/1 to size 3 with good position relative to standard landmarks. Calcar reamer was passed after trials showed satisfactory recovery of limb lengths. Final component seated completely relative to the reaming with excellent rotational stability. Limb lengths appeared to be restored best with +0x36 mm head and neck adapter. Final components assembled and reduced. Wound thoroughly irrigated and closed in layers without a drain. Bleeding appeared to be well controlled at closure. Joint space injected with ropivacaine through a spinal needle placed percutaneously during closure. Standard Ni dressing applied. Final counts were correct. Patient stable to recovery having tolerated procedure well throughout.          John Barrios MD    Date: 12/11/2019  Time: 12:04 PM

## 2019-12-11 NOTE — H&P
Pre-Op H&P  Yasmin Ribeiro  [id number]  1960    Chief complaint: Right hip pain    HPI:    Patient is a 58 y.o.female who presents with a history of right hip pain. She has been diagnosed with primary osteoarthritis of the right hip. Conservative treatment has failed to provide significant relief. Surgical intervention is recommended and she is agreeable. She is here today for a right total hip arthroplasty.     Review of Systems:  General ROS: negative for chills, fever or skin lesions;  No changes since last office visit  Cardiovascular ROS: no chest pain or dyspnea on exertion  Respiratory ROS: no cough, shortness of breath, or wheezing; former cigarette smoker (1 ppd x 10 years)    Allergies: No Known Allergies    Home Meds:    No current facility-administered medications on file prior to encounter.      No current outpatient medications on file prior to encounter.       PMH:   Past Medical History:   Diagnosis Date   • Chronic night sweats    • GERD (gastroesophageal reflux disease)      PSH:    Past Surgical History:   Procedure Laterality Date   • COLONOSCOPY     • ROTATOR CUFF REPAIR Right    • TUBAL ABDOMINAL LIGATION           Social History:   Tobacco:   Social History     Tobacco Use   Smoking Status Former Smoker   • Packs/day: 1.00   • Years: 10.00   • Pack years: 10.00   • Types: Cigarettes   Smokeless Tobacco Never Used   Tobacco Comment    off and on for 10 years       Alcohol:     Social History     Substance and Sexual Activity   Alcohol Use No   • Frequency: Never       Vitals:           /80 (BP Location: Right arm, Patient Position: Sitting)   Pulse 64   Temp 97.9 °F (36.6 °C) (Temporal)   Resp 18   SpO2 98%   Breastfeeding No     Physical Exam:  General Appearance:    Alert, cooperative, no distress, appears stated age   Head:    Normocephalic, without obvious abnormality, atraumatic   Lungs:     Clear to auscultation bilaterally, respirations unlabored    Heart:    Regular rate and rhythm, S1 and S2 normal, no murmur, rub    or gallop    Abdomen:    Soft, non-tender, +bowel sounds   Breast Exam:    deferred   Genitalia:    deferred   Extremities:   Extremities normal, atraumatic, no cyanosis or edema   Skin:   Skin color, texture, turgor normal, no rashes or lesions   Neurologic:   Grossly intact   Results Review  LABS:  Lab Results   Component Value Date    WBC 10.12 12/05/2019    HGB 14.8 12/05/2019    HCT 45.3 12/05/2019    .6 (H) 12/05/2019     12/05/2019    NEUTROABS 6.66 12/05/2019    GLUCOSE 71 12/05/2019    BUN 18 12/05/2019    CREATININE 0.92 12/05/2019    EGFRIFNONA 62 12/05/2019     12/05/2019    K 4.1 12/05/2019    CL 99 12/05/2019    CO2 24.0 12/05/2019    CALCIUM 9.6 12/05/2019    ALBUMIN 4.70 12/05/2019    AST 24 12/05/2019    ALT 19 12/05/2019    BILITOT 0.5 12/05/2019       RADIOLOGY:  Imaging Results (Last 72 Hours)     ** No results found for the last 72 hours. **          I reviewed the patient's new clinical results.    11/21/19 ECG: reviewed- sinus bradycardia with ventricular rate of 48  11/21/19 CXR: reviewed- no active cardiopulmonary disease      Cancer Staging (if applicable)  Cancer Patient: __ yes _X_no __unknown; If yes, clinical stage T:__ N:__M:__, stage group or __N/A    Impression: Primary osteoarthritis of the right hip    Plan: Right total hip arthroplasty      Shelley Noyola, APRN   12/11/2019   7:57 AM

## 2019-12-11 NOTE — ANESTHESIA PROCEDURE NOTES
Spinal Block      Patient reassessed immediately prior to procedure    Patient location during procedure: OR  Indication:at surgeon's request  Performed By  CRNA: Quoc Urbina CRNA  Preanesthetic Checklist  Completed: patient identified, site marked, surgical consent, pre-op evaluation, timeout performed, IV checked, risks and benefits discussed and monitors and equipment checked  Spinal Block Prep:  Patient Position:sitting  Sterile Tech:cap, gloves, sterile barriers and mask  Prep:Chloraprep  Patient Monitoring:blood pressure monitoring, continuous pulse oximetry and EKG  Spinal Block Procedure  Approach:midline  Guidance:landmark technique and palpation technique  Location:L4-L5  Needle Type:Sprotte  Needle Gauge:25 G  Placement of Spinal needle event:cerebrospinal fluid aspirated  Paresthesia: no  Fluid Appearance:clear  Medications: bupivacaine (MARCAINE) 0.5 % injection, 1.8 mL  Med Administered at 12/11/2019 9:57 AM   Post Assessment  Patient Tolerance:patient tolerated the procedure well with no apparent complications  Complications no  Additional Notes  Procedure:  Pt assisted to sitting position, with legs in position of comfort over side of bed.  Pt. instructed in optimal spine presentation, the spine was prepped/ Draped and the skin at insertion site was anesthetized with 1% Lidocaine 2 ml.  The spinal needle was then advanced until CSF flow was obtained and LA was injected:

## 2019-12-11 NOTE — THERAPY EVALUATION
Patient Name: Yasmin Ribeiro  : 1960    MRN: [id number]                              Today's Date: 2019       Admit Date: 2019    Visit Dx: No diagnosis found.  Patient Active Problem List   Diagnosis   • Arthritis of right hip   • Status post total replacement of right hip   • Prediabetes   • Tobacco use     Past Medical History:   Diagnosis Date   • Chronic night sweats    • GERD (gastroesophageal reflux disease)      Past Surgical History:   Procedure Laterality Date   • COLONOSCOPY     • ROTATOR CUFF REPAIR Right    • TUBAL ABDOMINAL LIGATION       General Information     Row Name 19 1525          PT Evaluation Time/Intention    Document Type  evaluation  -     Mode of Treatment  individual therapy;physical therapy  -     Row Name 19 1525          General Information    Patient Profile Reviewed?  yes  -     Prior Level of Function  min assist:;home management;ADL's;all household mobility;community mobility  -     Existing Precautions/Restrictions  fall;hip, anterior  -     Barriers to Rehab  none identified  -     Row Name 19 1525          Relationship/Environment    Lives With  alone will be staying with her sister initially for a few weeks  -     Row Name 19 1525          Resource/Environmental Concerns    Current Living Arrangements  home/apartment/condo  -     Row Name 19 1525          Home Main Entrance    Number of Stairs, Main Entrance  five at her sister's place  -     Stair Railings, Main Entrance  railing on right side (ascending)  -     Row Name 19 1525          Stairs Within Home, Primary    Number of Stairs, Within Home, Primary  none  -     Row Name 19 1525          Cognitive Assessment/Intervention- PT/OT    Orientation Status (Cognition)  oriented x 4  -     Row Name 19 1525          Safety Issues, Functional Mobility    Safety Issues Affecting Function (Mobility)  safety precaution  awareness;safety precautions follow-through/compliance  -ANTHONY     Impairments Affecting Function (Mobility)  pain;endurance/activity tolerance;strength;range of motion (ROM)  -ANTHONY       User Key  (r) = Recorded By, (t) = Taken By, (c) = Cosigned By    Initials Name Provider Type    ANTHONY Cabrera Durand, PT Physical Therapist        Mobility     Row Name 12/11/19 1525          Bed Mobility Assessment/Treatment    Bed Mobility Assessment/Treatment  supine-sit;scooting/bridging  -ANTHONY     Scooting/Bridging [place] (Bed Mobility)  verbal cues;supervision  -ANTHONY     Supine-Sit [place] (Bed Mobility)  supervision;verbal cues  -ANTHONY     Assistive Device (Bed Mobility)  bed rails;head of bed elevated  -ANTHONY     Comment (Bed Mobility)  Verbal cues for LE sequencing off EOB while maintaining hip precautions  -     Row Name 12/11/19 1525          Transfer Assessment/Treatment    Comment (Transfers)  Verbal cues for use of UEs to push up to stand, reach back for sitting, and move R LE out for comfort prior to sitting; toilet transfer performed requiring CGA x1 and independent with hygiene  -     Row Name 12/11/19 1525          Sit-Stand Transfer    Sit-Stand [place] (Transfers)  verbal cues;contact guard;2 person assist  -ANTHONY     Assistive Device (Sit-Stand Transfers)  walker, front-wheeled  -ANTHONY     Row Name 12/11/19 1525          Gait/Stairs Assessment/Training    Gait/Stairs Assessment/Training  gait/ambulation independence;gait/ambulation assistive device  -ANTHONY     [place] Level (Gait)  verbal cues;contact guard;2 person assist  -ANTHONY     Assistive Device (Gait)  walker, front-wheeled  -     Distance in Feet (Gait)  140 feet  -ANTHONY     Pattern (Gait)  step-through  -ANTHONY     Deviations/Abnormal Patterns (Gait)  bilateral deviations;[place] decreased;gait speed decreased;stride length decreased  -ANTHONY     Bilateral Gait Deviations  forward flexed posture  -ANTHONY     Right Sided Gait Deviations  weight shift ability decreased  -ANTHONY      [place] Level (Stairs)  not tested  -ANTHONY     Comment (Gait/Stairs)  Pt ambulated with step through pattern and decreased speed. Verbal cues for increasing width of stance for balance, foot placement, and maintaining upright posture. Gait limited by fatigue.   -     Row Name 12/11/19 1525          Mobility Assessment/Intervention    Extremity Weight-bearing Status  right lower extremity  -ANTHONY     Right Lower Extremity (Weight-bearing Status)  weight-bearing as tolerated (WBAT)  -ANTHONY       User Key  (r) = Recorded By, (t) = Taken By, (c) = Cosigned By    Initials Name Provider Type    Cabrera Magallon, TIA Physical Therapist        Obj/Interventions     Row Name 12/11/19 1525          General ROM    GENERAL ROM COMMENTS  R LE AROM impaired 25%; L LE WFL  -ANTHONY     [facility] Name 12/11/19 1525          MMT (Manual Muscle Testing)    General MMT Comments  R LE functionally 4-/5; L LE functionally 4+/5; able to actively SLR independently and bilaterally  -ANTHONY     Row Name 12/11/19 1525          Therapeutic Exercise    Lower Extremity (Therapeutic Exercise)  gluteal sets;quad sets, bilateral  -ANTHONY     Lower Extremity Range of Motion (Therapeutic Exercise)  ankle dorsiflexion/plantar flexion, bilateral  -ANTHONY     Exercise Type (Therapeutic Exercise)  AROM (active range of motion);isometric contraction, static  -ANTHONY     Position (Therapeutic Exercise)  seated  -ANTHONY     Sets/Reps (Therapeutic Exercise)  10x each  -ANTHONY     Expected Outcome (Therapeutic Exercise)  facilitate normal movement patterns;improve functional tolerance, self-care activity  -ANTHONY     Comment (Therapeutic Exercise)  Cues for technique; able to independently perform active DF  -ANTHONY     Row Name 12/11/19 1525          Sensory Assessment/Intervention    Sensory General Assessment  no sensation deficits identified  -ANTHONY       User Key  (r) = Recorded By, (t) = Taken By, (c) = Cosigned By    Initials Name Provider Type    Cabrera Magallon, PT Physical Therapist         Goals/Plan     Row Name 12/11/19 1525          Bed Mobility Goal 1 (PT)    Activity/Assistive Device (Bed Mobility Goal 1, PT)  sit to supine/supine to sit  -ANTHONY     [place] Level/Cues Needed (Bed Mobility Goal 1, PT)  conditional independence  -ANTHONY     Time Frame (Bed Mobility Goal 1, PT)  long term goal (LTG);3 days  -ANTHONY     Row Name 12/11/19 1525          Transfer Goal 1 (PT)    Activity/Assistive Device (Transfer Goal 1, PT)  sit-to-stand/stand-to-sit;walker, rolling  -ANTHONY     [place] Level/Cues Needed (Transfer Goal 1, PT)  conditional independence  -ANTHONY     Time Frame (Transfer Goal 1, PT)  long term goal (LTG);3 days  -ANTHONY     [facility] Name 12/11/19 1525          Gait Training Goal 1 (PT)    Activity/Assistive Device (Gait Training Goal 1, PT)  gait (walking locomotion);walker, rolling  -ANTHONY     [place] Level (Gait Training Goal 1, PT)  conditional independence  -ANTHONY     Distance (Gait Goal 1, PT)  300 feet  -ANTHONY     Time Frame (Gait Training Goal 1, PT)  long term goal (LTG);3 days  -ANTHONY     [facility] Name 12/11/19 1525          Stairs Goal 1 (PT)    Activity/Assistive Device (Stairs Goal 1, PT)  stairs, all skills;cane, straight;using handrail, right  -ANTHONY     [place] Level/Cues Needed (Stairs Goal 1, PT)  contact guard assist  -ANTHONY     Number of Stairs (Stairs Goal 1, PT)  5  -ANTHONY     Time Frame (Stairs Goal 1, PT)  long term goal (LTG);3 days  -ANTHONY       User Key  (r) = Recorded By, (t) = Taken By, (c) = Cosigned By    Initials Name Provider Type    Cabrera Magallon, PT Physical Therapist        Clinical Impression     Row Name 12/11/19 1525          Pain Assessment    Additional Documentation  Pain Scale: Numbers Pre/Post-Treatment (Group)  -ANTHONY     [facility] Name 12/11/19 1525          Pain Scale: Numbers Pre/Post-Treatment    Pain Scale: Numbers, Pretreatment  8/10  -ANTHONY     Pain Scale: Numbers, Post-Treatment  8/10  -ANTHONY     Pain Location - Side  Right  -ANTHONY     Pain Location  hip  -ANTHONY     Pain Intervention(s)   Cold applied;Repositioned;Ambulation/increased activity  -ANTHONY     Row Name 12/11/19 1525          Physical Therapy Clinical Impression    Patient/Family Goals Statement (PT Clinical Impression)  To return home  -ANTHONY     Criteria for Skilled Interventions Met (PT Clinical Impression)  yes;treatment indicated  -ANTHONY     Rehab Potential (PT Clinical Summary)  good, to achieve stated therapy goals  -ANTHONY     Row Name 12/11/19 1525          Positioning and Restraints    Pre-Treatment Position  in bed  -ANTHONY     Post Treatment Position  chair  -ANTHONY     In Chair  notified nsg;reclined;call light within reach;encouraged to call for assist;exit alarm on;legs elevated  -ANTHONY       User Key  (r) = Recorded By, (t) = Taken By, (c) = Cosigned By    Initials Name Provider Type    Cabrera Magallon PT Physical Therapist        Outcome Measures     Row Name 12/11/19 1525          How much help from another person do you currently need...    Turning from your back to your side while in flat bed without using bedrails?  4  -ANTHONY     Moving from lying on back to sitting on the side of a flat bed without bedrails?  4  -ANTHONY     Moving to and from a bed to a chair (including a wheelchair)?  3  -ANTHONY     Standing up from a chair using your arms (e.g., wheelchair, bedside chair)?  3  -ANTHONY     Climbing 3-5 steps with a railing?  2  -ANTHONY     To walk in hospital room?  3  -ANTHONY     AM-PAC 6 Clicks Score (PT)  19  -ANTHONY     Row Name 12/11/19 1525          Functional Assessment    Outcome Measure Options  AM-PAC 6 Clicks Basic Mobility (PT)  -ANTHONY       User Key  (r) = Recorded By, (t) = Taken By, (c) = Cosigned By    Initials Name Provider Type    Cabrera Magallon PT Physical Therapist        Physical Therapy Education                 Title: PT OT SLP Therapies (Done)     Topic: Physical Therapy (Done)     Point: Mobility training (Done)     Description:   Instruct learner(s) on safety and technique for assisting patient out of bed, chair or wheelchair.  Instruct in the  proper use of assistive devices, such as walker, crutches, cane or brace.              Patient Friendly Description:   It's important to get you on your feet again, but we need to do so in a way that is safe for you. Falling has serious consequences, and your personal safety is the most important thing of all.        When it's time to get out of bed, one of us or a family member will sit next to you on the bed to give you support.     If your doctor or nurse tells you to use a walker, crutches, a cane, or a brace, be sure you use it every time you get out of bed, even if you think you don't need it.    Learning Progress Summary           Patient Acceptance, E,D, VU by ANTHONY at 12/11/2019 1525    Comment:  Educated on safe sequencing with ambulatory/toilet transfers, bed mobility, and gait. Reviewed HEP and hip precautions.                   Point: Home exercise program (Done)     Description:   Instruct learner(s) on appropriate technique for monitoring, assisting and/or progressing patient with therapeutic exercises and activities.              Learning Progress Summary           Patient Acceptance, E,D, VU by ANTHONY at 12/11/2019 1525    Comment:  Educated on safe sequencing with ambulatory/toilet transfers, bed mobility, and gait. Reviewed HEP and hip precautions.                   Point: Body mechanics (Done)     Description:   Instruct learner(s) on proper positioning and spine alignment for patient and/or caregiver during mobility tasks and/or exercises.              Learning Progress Summary           Patient Acceptance, E,D, VU by ANTHONY at 12/11/2019 1525    Comment:  Educated on safe sequencing with ambulatory/toilet transfers, bed mobility, and gait. Reviewed HEP and hip precautions.                   Point: Precautions (Done)     Description:   Instruct learner(s) on prescribed precautions during mobility and gait tasks              Learning Progress Summary           Patient Acceptance, E,D, VU by ANTHONY at 12/11/2019  1525    Comment:  Educated on safe sequencing with ambulatory/toilet transfers, bed mobility, and gait. Reviewed HEP and hip precautions.                               User Key     Initials Effective Dates Name Provider Type Discipline     09/10/19 -  Cabrera Durand, PT Physical Therapist PT              PT Recommendation and Plan  Planned Therapy Interventions (PT Eval): balance training, bed mobility training, gait training, home exercise program, patient/family education, strengthening, stair training, transfer training, ROM (range of motion)  Outcome Summary/Treatment Plan (PT)  Anticipated Equipment Needs at Discharge (PT): other (see comments)(none)  Anticipated Discharge Disposition (PT): home with assist, home with home health  Plan of Care Reviewed With: patient  Progress: improving  Outcome Summary: Pt ambulated 140 feet with CGA x2 and front-wheeled walker. Gait limited by fatigue and pain. No knee buckling noted during ambulation. STS requiring CGA x2. Reviewed HEP and hip precautions. Will progress to stair training POD#1. Pt also states she will be staying with her sister for the first few weeks upon d/c. PADD score = 9. Anticipate pt d/c home with assist and HHPT.     Time Calculation:   PT Charges     Row Name 12/11/19 1525             Time Calculation    Start Time  1525  -ANTHONY      PT Received On  12/11/19  -      PT Goal Re-Cert Due Date  12/21/19  -         Time Calculation- PT    TCU Minutes- PT  10 min  -         Timed Charges    50663 - Gait Training Minutes   8  -      34576 - PT Therapeutic Activity Minutes  2  -ANTHONY        User Key  (r) = Recorded By, (t) = Taken By, (c) = Cosigned By    Initials Name Provider Type    ANTHONY Cabrera Durand, PT Physical Therapist        Therapy Charges for Today     Code Description Service Date Service Provider Modifiers Qty    11936914126 HC GAIT TRAINING EA 15 MIN 12/11/2019 Cabrera Durand, PT GP 1    60259669838 HC PT EVAL LOW COMPLEXITY 3 12/11/2019 Cabrera Durand,  PT GP 1    [id number]  PT THER SUPP EA 15 MIN 12/11/2019 Cabrera Durand, PT GP 2          PT G-Codes  Outcome Measure Options: AM-PAC 6 Clicks Basic Mobility (PT)  AM-PAC 6 Clicks Score (PT): 19    Cabrera Durand, PT  12/11/2019

## 2019-12-11 NOTE — ANESTHESIA POSTPROCEDURE EVALUATION
Patient: Yasmin Ribeiro    Procedure Summary     Date:  12/11/19 Room / Location:   [facility] /  [facility]    Anesthesia Start:  0950 Anesthesia Stop:      Procedure:  TOTAL HIP ARTHROPLASTY ANTERIOR RIGHT (Right Hip) Diagnosis:      Surgeon:  John Barrios MD Provider:  Chase Washington MD    Anesthesia Type:  general, spinal ASA Status:  2          Anesthesia Type: general, spinal    Vitals  No vitals data found for the desired time range.          Post Anesthesia Care and Evaluation    Patient location during evaluation: PACU  Patient participation: complete - patient participated  Level of consciousness: awake and alert  Pain score: 0  Pain management: adequate  Airway patency: patent  Anesthetic complications: No anesthetic complications  PONV Status: none  Cardiovascular status: hemodynamically stable and acceptable  Respiratory status: nonlabored ventilation, acceptable and nasal cannula  Hydration status: acceptable

## 2019-12-12 VITALS
HEART RATE: 78 BPM | OXYGEN SATURATION: 97 % | SYSTOLIC BLOOD PRESSURE: 124 MMHG | RESPIRATION RATE: 16 BRPM | TEMPERATURE: 98.5 F | DIASTOLIC BLOOD PRESSURE: 70 MMHG

## 2019-12-12 PROBLEM — G89.18 ACUTE POSTOPERATIVE PAIN: Status: ACTIVE | Noted: 2019-12-12

## 2019-12-12 PROBLEM — D62 ACUTE BLOOD LOSS ANEMIA: Status: ACTIVE | Noted: 2019-12-12

## 2019-12-12 PROBLEM — D72.829 LEUKOCYTOSIS: Status: ACTIVE | Noted: 2019-12-12

## 2019-12-12 LAB
ANION GAP SERPL CALCULATED.3IONS-SCNC: 8 MMOL/L (ref 5–15)
BASOPHILS # BLD AUTO: 0.02 10*3/MM3 (ref 0–0.2)
BASOPHILS NFR BLD AUTO: 0.2 % (ref 0–1.5)
BUN BLD-MCNC: 8 MG/DL (ref 8–23)
BUN/CREAT SERPL: 11.8 (ref 7–25)
CALCIUM SPEC-SCNC: 8.7 MG/DL (ref 8.6–10.5)
CHLORIDE SERPL-SCNC: 107 MMOL/L (ref 98–107)
CO2 SERPL-SCNC: 23 MMOL/L (ref 22–29)
CREAT BLD-MCNC: 0.68 MG/DL (ref 0.57–1)
DEPRECATED RDW RBC AUTO: 46.3 FL (ref 37–54)
EOSINOPHIL # BLD AUTO: 0.04 10*3/MM3 (ref 0–0.4)
EOSINOPHIL NFR BLD AUTO: 0.3 % (ref 0.3–6.2)
ERYTHROCYTE [DISTWIDTH] IN BLOOD BY AUTOMATED COUNT: 12.3 % (ref 12.3–15.4)
GFR SERPL CREATININE-BSD FRML MDRD: 88 ML/MIN/1.73
GLUCOSE BLD-MCNC: 117 MG/DL (ref 65–99)
GLUCOSE BLDC GLUCOMTR-MCNC: 110 MG/DL (ref 70–130)
GLUCOSE BLDC GLUCOMTR-MCNC: 129 MG/DL (ref 70–130)
GLUCOSE BLDC GLUCOMTR-MCNC: 153 MG/DL (ref 70–130)
HCT VFR BLD AUTO: 36.5 % (ref 34–46.6)
HGB BLD-MCNC: 11.8 G/DL (ref 12–15.9)
IMM GRANULOCYTES # BLD AUTO: 0.05 10*3/MM3 (ref 0–0.05)
IMM GRANULOCYTES NFR BLD AUTO: 0.4 % (ref 0–0.5)
LYMPHOCYTES # BLD AUTO: 1.63 10*3/MM3 (ref 0.7–3.1)
LYMPHOCYTES NFR BLD AUTO: 14.1 % (ref 19.6–45.3)
MCH RBC QN AUTO: 32.9 PG (ref 26.6–33)
MCHC RBC AUTO-ENTMCNC: 32.3 G/DL (ref 31.5–35.7)
MCV RBC AUTO: 101.7 FL (ref 79–97)
MONOCYTES # BLD AUTO: 1.16 10*3/MM3 (ref 0.1–0.9)
MONOCYTES NFR BLD AUTO: 10 % (ref 5–12)
NEUTROPHILS # BLD AUTO: 8.66 10*3/MM3 (ref 1.7–7)
NEUTROPHILS NFR BLD AUTO: 75 % (ref 42.7–76)
NRBC BLD AUTO-RTO: 0 /100 WBC (ref 0–0.2)
PLAT MORPH BLD: NORMAL
PLATELET # BLD AUTO: 228 10*3/MM3 (ref 140–450)
PMV BLD AUTO: 10.1 FL (ref 6–12)
POTASSIUM BLD-SCNC: 4.2 MMOL/L (ref 3.5–5.2)
RBC # BLD AUTO: 3.59 10*6/MM3 (ref 3.77–5.28)
RBC MORPH BLD: NORMAL
SODIUM BLD-SCNC: 138 MMOL/L (ref 136–145)
WBC MORPH BLD: NORMAL
WBC NRBC COR # BLD: 11.56 10*3/MM3 (ref 3.4–10.8)

## 2019-12-12 PROCEDURE — 25010000003 CEFAZOLIN IN DEXTROSE 2-4 GM/100ML-% SOLUTION: Performed by: ORTHOPAEDIC SURGERY

## 2019-12-12 PROCEDURE — 85025 COMPLETE CBC W/AUTO DIFF WBC: CPT | Performed by: ORTHOPAEDIC SURGERY

## 2019-12-12 PROCEDURE — 85007 BL SMEAR W/DIFF WBC COUNT: CPT | Performed by: ORTHOPAEDIC SURGERY

## 2019-12-12 PROCEDURE — 80048 BASIC METABOLIC PNL TOTAL CA: CPT | Performed by: NURSE PRACTITIONER

## 2019-12-12 PROCEDURE — 82962 GLUCOSE BLOOD TEST: CPT

## 2019-12-12 PROCEDURE — 97110 THERAPEUTIC EXERCISES: CPT

## 2019-12-12 PROCEDURE — 97116 GAIT TRAINING THERAPY: CPT

## 2019-12-12 RX ORDER — HYDROCODONE BITARTRATE AND ACETAMINOPHEN 5; 325 MG/1; MG/1
1 TABLET ORAL EVERY 4 HOURS PRN
Qty: 40 TABLET | Refills: 0 | Status: SHIPPED | OUTPATIENT
Start: 2019-12-12 | End: 2019-12-21

## 2019-12-12 RX ORDER — ASPIRIN 325 MG
325 TABLET ORAL DAILY
Qty: 30 TABLET | Refills: 0 | Status: SHIPPED | OUTPATIENT
Start: 2019-12-13

## 2019-12-12 RX ORDER — DOCUSATE SODIUM 100 MG/1
100 CAPSULE, LIQUID FILLED ORAL 2 TIMES DAILY
Qty: 60 CAPSULE | Refills: 0 | Status: SHIPPED | OUTPATIENT
Start: 2019-12-12

## 2019-12-12 RX ADMIN — HYDROCODONE BITARTRATE AND ACETAMINOPHEN 1 TABLET: 5; 325 TABLET ORAL at 12:16

## 2019-12-12 RX ADMIN — ESCITALOPRAM OXALATE 10 MG: 10 TABLET ORAL at 06:26

## 2019-12-12 RX ADMIN — HYDROCODONE BITARTRATE AND ACETAMINOPHEN 1 TABLET: 5; 325 TABLET ORAL at 03:50

## 2019-12-12 RX ADMIN — HYDROCODONE BITARTRATE AND ACETAMINOPHEN 1 TABLET: 5; 325 TABLET ORAL at 07:58

## 2019-12-12 RX ADMIN — VARENICLINE TARTRATE 1 MG: 0.5 TABLET, FILM COATED ORAL at 07:57

## 2019-12-12 RX ADMIN — PANTOPRAZOLE SODIUM 40 MG: 40 TABLET, DELAYED RELEASE ORAL at 06:26

## 2019-12-12 RX ADMIN — ASPIRIN 325 MG ORAL TABLET 325 MG: 325 PILL ORAL at 09:02

## 2019-12-12 RX ADMIN — CEFAZOLIN SODIUM 2 G: 2 INJECTION, SOLUTION INTRAVENOUS at 03:50

## 2019-12-12 RX ADMIN — SODIUM CHLORIDE 150 ML/HR: 9 INJECTION, SOLUTION INTRAVENOUS at 00:09

## 2019-12-12 RX ADMIN — BISACODYL 10 MG: 5 TABLET, COATED ORAL at 07:57

## 2019-12-12 RX ADMIN — DOCUSATE SODIUM 100 MG: 100 CAPSULE, LIQUID FILLED ORAL at 07:58

## 2019-12-12 RX ADMIN — HYDROCODONE BITARTRATE AND ACETAMINOPHEN 1 TABLET: 5; 325 TABLET ORAL at 00:09

## 2019-12-12 NOTE — THERAPY TREATMENT NOTE
Patient Name: Yasmin Ribeiro  : 1960    MRN: [id number]                              Today's Date: 2019       Admit Date: 2019    Visit Dx:     ICD-10-CM ICD-9-CM   1. Status post total replacement of right hip Z96.641 V43.64     Patient Active Problem List   Diagnosis   • Arthritis of right hip   • Status post total replacement of right hip   • Prediabetes   • Tobacco use     Past Medical History:   Diagnosis Date   • Chronic night sweats    • GERD (gastroesophageal reflux disease)      Past Surgical History:   Procedure Laterality Date   • COLONOSCOPY     • ROTATOR CUFF REPAIR Right    • TOTAL HIP ARTHROPLASTY Right 2019    Procedure: TOTAL HIP ARTHROPLASTY ANTERIOR RIGHT;  Surgeon: John Barrios MD;  Location: [facility];  Service: Orthopedics   • TUBAL ABDOMINAL LIGATION       General Information     Row Name 19          PT Evaluation Time/Intention    Document Type  discharge treatment  -     Mode of Treatment  individual therapy;physical therapy  -     Row Name 19          General Information    Patient Profile Reviewed?  yes  -ANTHONY     Existing Precautions/Restrictions  fall;hip, anterior  -     Row Name 19          Cognitive Assessment/Intervention- PT/OT    Orientation Status (Cognition)  oriented x 4  -     Row Name 19          Safety Issues, Functional Mobility    Safety Issues Affecting Function (Mobility)  safety precaution awareness;safety precautions follow-through/compliance  -ANTHONY     Impairments Affecting Function (Mobility)  pain;endurance/activity tolerance;strength;range of motion (ROM)  -ANTHONY       User Key  (r) = Recorded By, (t) = Taken By, (c) = Cosigned By    Initials Name Provider Type    ANTHONY Cabrera Durand, PT Physical Therapist        Mobility     Row Name 19          Bed Mobility Assessment/Treatment    Bed Mobility Assessment/Treatment  supine-sit;scooting/bridging  -ANTHONY     Scooting/Bridging  [place] (Bed Mobility)  verbal cues;supervision  -ANTHONY     Supine-Sit [place] (Bed Mobility)  supervision;verbal cues  -ANTHONY     Assistive Device (Bed Mobility)  bed rails;head of bed elevated  -ANTHONY     Comment (Bed Mobility)  Verbal cues for LE sequencing  -ANTHONY     Row Name 12/12/19 0905          Transfer Assessment/Treatment    Comment (Transfers)  Verbal cues for safe hand placement during standing/sitting and moving R LE out for comfort prior to sitting; toilet transfer performed requiring SBA  -ANTHONY     Row Name 12/12/19 0905          Sit-Stand Transfer    Sit-Stand [place] (Transfers)  verbal cues;supervision  -ANTHONY     Assistive Device (Sit-Stand Transfers)  walker, front-wheeled  -ANTHONY     Row Name 12/12/19 0905          Gait/Stairs Assessment/Training    Gait/Stairs Assessment/Training  gait/ambulation independence;gait/ambulation assistive device  -ANTHONY     [place] Level (Gait)  verbal cues;contact guard  -ANTHONY     Assistive Device (Gait)  walker, front-wheeled  -ANTHONY     Distance in Feet (Gait)  350 feet  -ANTHONY     Pattern (Gait)  step-through  -ANTHONY     Deviations/Abnormal Patterns (Gait)  bilateral deviations;[place] decreased;gait speed decreased;stride length decreased  -ANTHONY     Bilateral Gait Deviations  forward flexed posture  -ANTHONY     Right Sided Gait Deviations  weight shift ability decreased  -ANTHONY     [place] Level (Stairs)  verbal cues;contact guard  -ANTHONY     Assistive Device (Stairs)  other (see comments) none  -ANTHONY     Handrail Location (Stairs)  both sides  -ANTHONY     Number of Steps (Stairs)  5  -ANTHONY     Ascending Technique (Stairs)  step-to-step  -ANTHONY     Descending Technique (Stairs)  step-to-step  -ANTHONY     Comment (Gait/Stairs)  Pt ambulated with step through pattern and improved speed. Verbal cues for increase WB on the R LE and maintaining upright posture. Pt ascended/descended 5 steps with CGA x1 and handrails on both sides. Verbal cues for LE sequencing and safe hand placement. Gait/stair  training limited by fatigue.   -     Row Name 12/12/19 0905          Mobility Assessment/Intervention    Extremity Weight-bearing Status  right lower extremity  -ANTHONY     Right Lower Extremity (Weight-bearing Status)  weight-bearing as tolerated (WBAT)  -       User Key  (r) = Recorded By, (t) = Taken By, (c) = Cosigned By    Initials Name Provider Type    Cabrera Magallon PT Physical Therapist        Obj/Interventions     Row Name 12/12/19 0905          Therapeutic Exercise    Lower Extremity (Therapeutic Exercise)  gluteal sets;quad sets, right;SLR (straight leg raise), right;LAQ (long arc quad), right;heel slides, right  -ANTHONY     Lower Extremity Range of Motion (Therapeutic Exercise)  ankle dorsiflexion/plantar flexion, bilateral;hip abduction/adduction, right  -ANTHONY     Exercise Type (Therapeutic Exercise)  AROM (active range of motion);AAROM (active assistive range of motion)  -ANTHONY     Position (Therapeutic Exercise)  seated  -ANTHONY     Sets/Reps (Therapeutic Exercise)  15x each  -ANTHONY     Expected Outcome (Therapeutic Exercise)  facilitate normal movement patterns;improve functional tolerance, self-care activity  -ANTHONY     Comment (Therapeutic Exercise)  Cues for technique  -       User Key  (r) = Recorded By, (t) = Taken By, (c) = Cosigned By    Initials Name Provider Type    Cabrera Magallon, TIA Physical Therapist        Goals/Plan     [facility] Name 12/12/19 0905          Bed Mobility Goal 1 (PT)    Activity/Assistive Device (Bed Mobility Goal 1, PT)  sit to supine/supine to sit  -ANTHONY     [place] Level/Cues Needed (Bed Mobility Goal 1, PT)  conditional independence  -ANTHONY     Time Frame (Bed Mobility Goal 1, PT)  long term goal (LTG);3 days  -ANTHONY     Progress/Outcomes (Bed Mobility Goal 1, PT)  goal not met;discharged from facility  -     Row Name 12/12/19 0905          Transfer Goal 1 (PT)    Activity/Assistive Device (Transfer Goal 1, PT)  sit-to-stand/stand-to-sit;walker, rolling  -ANTHONY     [place] Level/Cues Needed  (Transfer Goal 1, PT)  conditional independence  -ANTHONY     Time Frame (Transfer Goal 1, PT)  long term goal (LTG);3 days  -ANTHONY     Progress/Outcome (Transfer Goal 1, PT)  goal not met;discharged from facility  -     Row Name 12/12/19 0905          Gait Training Goal 1 (PT)    Activity/Assistive Device (Gait Training Goal 1, PT)  gait (walking locomotion);walker, rolling  -ANTHONY     [place] Level (Gait Training Goal 1, PT)  conditional independence  -ANTHONY     Distance (Gait Goal 1, PT)  300 feet  -ANTHONY     Time Frame (Gait Training Goal 1, PT)  long term goal (LTG);3 days  -ANTHONY     Progress/Outcome (Gait Training Goal 1, PT)  goal partially met;discharged from facility  -     Row Name 12/12/19 0905          Stairs Goal 1 (PT)    Activity/Assistive Device (Stairs Goal 1, PT)  stairs, all skills;cane, straight;using handrail, right  -ANTHONY     [place] Level/Cues Needed (Stairs Goal 1, PT)  contact guard assist  -ANTHONY     Number of Stairs (Stairs Goal 1, PT)  5  -ANTHONY     Time Frame (Stairs Goal 1, PT)  long term goal (LTG);3 days  -ANTHONY     Progress/Outcome (Stairs Goal 1, PT)  goal not met  -ANTHONY       User Key  (r) = Recorded By, (t) = Taken By, (c) = Cosigned By    Initials Name Provider Type    Cabrera Magallon, PT Physical Therapist        Clinical Impression     Row Name 12/12/19 0905          Pain Scale: Numbers Pre/Post-Treatment    Pain Scale: Numbers, Pretreatment  6/10  -ANTHONY     Pain Scale: Numbers, Post-Treatment  6/10  -ANTHONY     Pain Location - Side  Right  -ANTHONY     Pain Location  hip  -ANTHONY     Pain Intervention(s)  Cold applied;Repositioned;Ambulation/increased activity  -     Row Name 12/12/19 0905          Physical Therapy Clinical Impression    Patient/Family Goals Statement (PT Clinical Impression)  To return home  -ANTHONY     Criteria for Skilled Interventions Met (PT Clinical Impression)  yes;treatment indicated  -ANTHONY     Rehab Potential (PT Clinical Summary)  good, to achieve stated therapy goals  -     Row Name  12/12/19 0905          Positioning and Restraints    Pre-Treatment Position  in bed  -ANTHONY     Post Treatment Position  chair  -ANTHONY     In Chair  notified nsg;reclined;call light within reach;encouraged to call for assist;exit alarm on;legs elevated  -ANTHONY       User Key  (r) = Recorded By, (t) = Taken By, (c) = Cosigned By    Initials Name Provider Type    Cabrera Magallon, PT Physical Therapist        Outcome Measures     Row Name 12/12/19 0820          How much help from another person do you currently need...    Turning from your back to your side while in flat bed without using bedrails?  4  -ANTHONY     Moving from lying on back to sitting on the side of a flat bed without bedrails?  4  -ANTHONY     Moving to and from a bed to a chair (including a wheelchair)?  3  -ANTHONY     Standing up from a chair using your arms (e.g., wheelchair, bedside chair)?  3  -ANTHONY     Climbing 3-5 steps with a railing?  3  -ANTHONY     To walk in hospital room?  3  -ANTHONY     AM-PAC 6 Clicks Score (PT)  20  -ANTHONY       User Key  (r) = Recorded By, (t) = Taken By, (c) = Cosigned By    Initials Name Provider Type    Cabrera Magallon, PT Physical Therapist        Physical Therapy Education                 Title: PT OT SLP Therapies (Done)     Topic: Physical Therapy (Done)     Point: Mobility training (Done)     Description:   Instruct learner(s) on safety and technique for assisting patient out of bed, chair or wheelchair.  Instruct in the proper use of assistive devices, such as walker, crutches, cane or brace.              Patient Friendly Description:   It's important to get you on your feet again, but we need to do so in a way that is safe for you. Falling has serious consequences, and your personal safety is the most important thing of all.        When it's time to get out of bed, one of us or a family member will sit next to you on the bed to give you support.     If your doctor or nurse tells you to use a walker, crutches, a cane, or a brace, be sure you use it  every time you get out of bed, even if you think you don't need it.    Learning Progress Summary           Patient Acceptance, E,D,H, VU by ANTHONY at 12/12/2019 0905    Comment:  Educated on safe sequencing with bed mobility, ambulatory/toilet/car transfers, gait and stairs. Reviewed HEP and hip precautions via handout.    Acceptance, E,D, VU by ANTHONY at 12/11/2019 1525    Comment:  Educated on safe sequencing with ambulatory/toilet transfers, bed mobility, and gait. Reviewed HEP and hip precautions.                   Point: Home exercise program (Done)     Description:   Instruct learner(s) on appropriate technique for monitoring, assisting and/or progressing patient with therapeutic exercises and activities.              Learning Progress Summary           Patient Acceptance, E,D,H, VU by ANTHONY at 12/12/2019 0905    Comment:  Educated on safe sequencing with bed mobility, ambulatory/toilet/car transfers, gait and stairs. Reviewed HEP and hip precautions via handout.    Acceptance, E,D, VU by ANTHONY at 12/11/2019 1525    Comment:  Educated on safe sequencing with ambulatory/toilet transfers, bed mobility, and gait. Reviewed HEP and hip precautions.                   Point: Body mechanics (Done)     Description:   Instruct learner(s) on proper positioning and spine alignment for patient and/or caregiver during mobility tasks and/or exercises.              Learning Progress Summary           Patient Acceptance, E,D,H, VU by ANTHONY at 12/12/2019 0905    Comment:  Educated on safe sequencing with bed mobility, ambulatory/toilet/car transfers, gait and stairs. Reviewed HEP and hip precautions via handout.    Acceptance, E,D, VU by ANTHONY at 12/11/2019 1525    Comment:  Educated on safe sequencing with ambulatory/toilet transfers, bed mobility, and gait. Reviewed HEP and hip precautions.                   Point: Precautions (Done)     Description:   Instruct learner(s) on prescribed precautions during mobility and gait tasks               Learning Progress Summary           Patient Acceptance, E,D,H, VU by ANTHONY at 12/12/2019 0905    Comment:  Educated on safe sequencing with bed mobility, ambulatory/toilet/car transfers, gait and stairs. Reviewed HEP and hip precautions via handout.    Acceptance, E,D, VU by ANTHONY at 12/11/2019 1525    Comment:  Educated on safe sequencing with ambulatory/toilet transfers, bed mobility, and gait. Reviewed HEP and hip precautions.                               User Key     Initials Effective Dates Name Provider Type [facility] 09/10/19 -  Cabrera Durand, PT Physical Therapist PT              PT Recommendation and Plan  Planned Therapy Interventions (PT Eval): balance training, bed mobility training, home exercise program, patient/family education, strengthening, stair training, transfer training  Outcome Summary/Treatment Plan (PT)  Anticipated Equipment Needs at Discharge (PT): other (see comments)(none)  Anticipated Discharge Disposition (PT): home with assist, home with home health  Plan of Care Reviewed With: patient  Progress: improving  Outcome Summary: Pt ambulated 350 feet with front-wheeled walker and CGA x1. Pt ascended/descended 5 steps with handrails on both sides and CGA x1. Gait/stair training limited by fatigue. Bed mobility, STS, and toilet transfers requiring supervision. Reviewed HEP and hip precautions. Anticipate pt d/c home with assist today and HHPT.     Time Calculation:   PT Charges     Row Name 12/12/19 0905             Time Calculation    Start Time  0905  -      PT Received On  12/12/19  -      PT Goal Re-Cert Due Date  12/21/19  -         Time Calculation- PT    TCU Minutes- PT  26 min  -         Timed Charges    40645 - PT Therapeutic Exercise Minutes  12  -ANTHONY      53315 - Gait Training Minutes   14  -ANTHONY        User Key  (r) = Recorded By, (t) = Taken By, (c) = Cosigned By    Initials Name Provider Type    Cabrera Magallon, PT Physical Therapist        Therapy Charges for Today     Code  Description Service Date Service Provider Modifiers Qty    54751507185 HC GAIT TRAINING EA 15 MIN 12/11/2019 Cabrera Durand, PT GP 1    98817252265 HC PT EVAL LOW COMPLEXITY 3 12/11/2019 Cabrera Durand, PT GP 1    95597676936 HC PT THER SUPP EA 15 MIN 12/11/2019 Cabrera Durand, PT GP 2    25519220062 HC PT THER PROC EA 15 MIN 12/12/2019 Cabrera Durand, PT GP 1    05114500140 HC GAIT TRAINING EA 15 MIN 12/12/2019 Cabrera Durand, PT GP 1          PT G-Codes  Outcome Measure Options: AM-PAC 6 Clicks Basic Mobility (PT)  AM-PAC 6 Clicks Score (PT): 20    Cabrera Durand, PT  12/12/2019

## 2019-12-12 NOTE — DISCHARGE SUMMARY
Patient Name: Yasmin Ribeiro  MRN: [id number]  : 1960  DOS: 2019    Attending: No att. providers found    Primary Care Provider: Danny Cortez MD    Date of Admission:.2019  7:35 AM    Date of Discharge:  2019    Discharge Diagnosis:   Status post total replacement of right hip    Arthritis of right hip    Prediabetes    Tobacco use    Acute postoperative pain    Leukocytosis, likely reactive    Acute blood loss anemia, mild, asymptomatic      Hospital Course  Patient is a 58 y.o. female presented for right total hip arthroplasty by Dr. Barrios.     She underwent surgery under GA. She tolerated surgery well and was admitted for further medical management.  Her hip has been painful for many years, severe for the last 6 months.  She denies use of assistive device for ambulation or recent falls.    The patient has done well postop. The patient has been able to ambulate 350 feet with PT.    The patient has had good pain control with PO pain medications.  Adjustments were made to pain medications to optimize postop pain management. Risks and benefits of opiate medications discussed with patient.    The patient was placed on DVT prophylaxis including aspirin. The patient was encouraged to use IS for atelectasis prophylaxis.   The patient was placed on a bowel regimen to prevent constipation while on pain medication.   The patient's H/H was monitored with a slight decrease that remained asymptomatic.    It is felt by all involved that the patient can discharge home at this time, and the patient has no further questions        Procedures Performed  2019     Pre-op Diagnosis:   Right hip osteoarthritis       Post-Op Diagnosis Codes:  Right hip osteoarthritis     Procedure/CPT® Codes:  85809     Procedure(s):  TOTAL HIP ARTHROPLASTY ANTERIOR RIGHT     Surgeon(s):  John Barrios MD    Pertinent Test Results:    I reviewed the patient's new clinical results.   Results from last 7  days   Lab Units 19  0349   WBC 10*3/mm3 11.56*   HEMOGLOBIN g/dL 11.8*   HEMATOCRIT % 36.5   PLATELETS 10*3/mm3 228     Results for A[place] CISSE (MRN [id number]) as of 2019 18:08   Ref. Range 2019 10:42   Hemoglobin Latest Ref Range: 12.0 - 15.9 g/dL 14.8   Hematocrit Latest Ref Range: 34.0 - 46.6 % 45.3     Results from last 7 days   Lab Units 19  0349   SODIUM mmol/L 138   POTASSIUM mmol/L 4.2   CHLORIDE mmol/L 107   CO2 mmol/L 23.0   BUN mg/dL 8   CREATININE mg/dL 0.68   CALCIUM mg/dL 8.7   GLUCOSE mg/dL 117*     Results for [place] CISSE (MRN [id number]) as of 2019 18:08   Ref. Range 2019 21:56 2019 03:49 2019 04:41 2019 08:21 2019 12:09   Glucose Latest Ref Range: 70 - 130 mg/dL 231 (H) 117 (H) 129 153 (H) 110     I reviewed the patient's new imaging including images and reports.      Physical therapy: Pt ambulated 350 feet with front-wheeled walker and CGA x1. Pt ascended/descended 5 steps with handrails on both sides and CGA x1. Gait/stair training limited by fatigue. Bed mobility, STS, and toilet transfers requiring supervision. Reviewed HEP and hip precautions. Anticipate pt d/c home with assist today and HHPT.    Discharge Assessment:    Vital Signs  Visit Vitals  /70 (BP Location: Right arm, Patient Position: Lying)   Pulse 78   Temp 98.5 °F (36.9 °C) (Oral)   Resp 16   SpO2 97%   Breastfeeding No     Temp (24hrs), Av.1 °F (36.7 °C), Min:97.8 °F (36.6 °C), Max:98.5 °F (36.9 °C)      General Appearance:    Alert, cooperative, in no acute distress   Lungs:     Clear to auscultation,respirations regular, even and                   unlabored    Heart:    Regular rhythm and normal rate, normal S1 and S2   Abdomen:     Normal bowel sounds, no masses, no organomegaly, soft        non-tender, non-distended, no guarding, no rebound                 tenderness   Extremities:   Moves all extremities well, no edema, no  cyanosis, no              Redness. Right hip YANETH dressing CDI   Pulses:   Pulses palpable and equal bilaterally   Skin:   No bleeding, bruising or rash   Neurologic:   Cranial nerves 2 - 12 grossly intact, sensation intact. Flexion and dorsiflexion intact bilateral feet.       Discharge Disposition: Home    Discharge Medications     Discharge Medications      New Medications      Instructions Start Date   aspirin 325 MG tablet   325 mg, Oral, Daily   Start Date:  December 13, 2019     HYDROcodone-acetaminophen 5-325 MG per tablet  Commonly known as:  NORCO   1 tablet, Oral, Every 4 Hours PRN      STOOL SOFTENER 100 MG capsule  Generic drug:  docusate sodium   100 mg, Oral, 2 Times Daily         Continue These Medications      Instructions Start Date   calcium carbonate 600 MG tablet  Commonly known as:  OS-KEZIA   600 mg, Oral, Daily      CBD KINGS EX   1 dose, Apply externally, As Needed      escitalopram 10 MG tablet  Commonly known as:  LEXAPRO   10 mg, Oral, Every Morning      melatonin 3 MG tablet   3 mg, Oral, Nightly PRN      norethindrone-ethinyl estradiol 1-5 MG-MCG tablet  Commonly known as:  FEMHRT 1/5   1 tablet, Oral, Daily      omeprazole 40 MG capsule  Commonly known as:  priLOSEC   40 mg, Oral, Every Other Day      tiZANidine 4 MG tablet  Commonly known as:  ZANAFLEX   4 mg, Oral, Nightly PRN      traZODone 100 MG tablet  Commonly known as:  DESYREL   100 mg, Oral, Nightly      TRULANCE 3 MG tablet  Generic drug:  Plecanatide   1 tablet, Oral, Every Morning      varenicline 1 MG tablet  Commonly known as:  CHANTIX   1 mg, Oral, 2 Times Daily             Discharge Diet: Consistent carb diet    Activity at Discharge: WBAT RLE    Follow-up Appointments  Dr. Barrios per his orders    Discharge took over 30 min.    HARDIK Cage  12/12/19  6:09 PM

## 2019-12-12 NOTE — PROGRESS NOTES
Yasmin Ribeiro  [id number]  1960    /71 (BP Location: Right arm, Patient Position: Lying)   Pulse 69   Temp 98.5 °F (36.9 °C) (Oral)   Resp 16   SpO2 97%   Breastfeeding No     Lab Results (last 24 hours)     Procedure Component Value Units Date/Time    POC Glucose Once [[id number]]  (Abnormal) Collected:  12/12/19 0821    Specimen:  Blood Updated:  12/12/19 0823     Glucose 153 mg/dL     CBC & Differential [[id number]] Collected:  12/12/19 0349    Specimen:  Blood Updated:  12/12/19 0608    Narrative:       The following orders were created for panel order CBC & Differential.  Procedure                               Abnormality         Status                     ---------                               -----------         ------                     CBC Auto Differential[[id number]]        Abnormal            Final result                 Please view results for these tests on the individual orders.    CBC Auto Differential [[id number]]  (Abnormal) Collected:  12/12/19 0349    Specimen:  Blood Updated:  12/12/19 0608     WBC 11.56 10*3/mm3      RBC 3.59 10*6/mm3      Hemoglobin 11.8 g/dL      Hematocrit 36.5 %      .7 fL      MCH 32.9 pg      MCHC 32.3 g/dL      RDW 12.3 %      RDW-SD 46.3 fl      MPV 10.1 fL      Platelets 228 10*3/mm3      Neutrophil % 75.0 %      Lymphocyte % 14.1 %      Monocyte % 10.0 %      Eosinophil % 0.3 %      Basophil % 0.2 %      Immature Grans % 0.4 %      Neutrophils, Absolute 8.66 10*3/mm3      Lymphocytes, Absolute 1.63 10*3/mm3      Monocytes, Absolute 1.16 10*3/mm3      Eosinophils, Absolute 0.04 10*3/mm3      Basophils, Absolute 0.02 10*3/mm3      Immature Grans, Absolute 0.05 10*3/mm3      nRBC 0.0 /100 WBC     Narrative:       Appended report. These results have been appended to a previously verified report.    Scan Slide [[id number]]  (Normal) Collected:  12/12/19 0349    Specimen:  Blood Updated:  12/12/19 0608     RBC Morphology Normal     WBC  Morphology Normal     Platelet Morphology Normal    POC Glucose Once [[id number]]  (Normal) Collected:  12/12/19 0441    Specimen:  Blood Updated:  12/12/19 0452     Glucose 129 mg/dL     Basic Metabolic Panel [[id number]]  (Abnormal) Collected:  12/12/19 0349    Specimen:  Blood Updated:  12/12/19 0446     Glucose 117 mg/dL      BUN 8 mg/dL      Creatinine 0.68 mg/dL      Sodium 138 mmol/L      Potassium 4.2 mmol/L      Chloride 107 mmol/L      CO2 23.0 mmol/L      Calcium 8.7 mg/dL      eGFR Non African Amer 88 mL/min/1.73      BUN/Creatinine Ratio 11.8     Anion Gap 8.0 mmol/L     Narrative:       GFR Normal >60  Chronic Kidney Disease <60  Kidney Failure <15      POC Glucose Once [[id number]]  (Abnormal) Collected:  12/11/19 2156    Specimen:  Blood Updated:  12/11/19 2157     Glucose 231 mg/dL     POC Glucose Once [[id number]]  (Abnormal) Collected:  12/11/19 2031    Specimen:  Blood Updated:  12/11/19 2035     Glucose 250 mg/dL     POC Glucose Once [[id number]]  (Normal) Collected:  12/11/19 1725    Specimen:  Blood Updated:  12/11/19 1735     Glucose 116 mg/dL           Patient Care Team:  Danny Cortez MD as PCP - General (Family Medicine)    SUBJECTIVE  Pain well controlled.  Good start in physical therapy.    PHYSICAL EXAM  Incision benign  Minimal thigh swelling  Neurovascularly intact to knees and toes       Status post total replacement of right hip    Arthritis of right hip    Prediabetes    Tobacco use      PLAN / DISPOSITION:  Hemoglobin 11.8 today- no transfusion anticipated  Discharge home today    John Barrios MD  12/12/19  9:00 AM

## 2019-12-12 NOTE — PROGRESS NOTES
Discharge Planning Assessment  [facility]     Patient Name: Yasmin Ribeiro  MRN: [id number]  Today's Date: 12/12/2019    Admit Date: 12/11/2019    Discharge Needs Assessment     Row Name 12/12/19 1102       Living Environment    Lives With  alone    Current Living Arrangements  home/apartment/condo    Primary Care Provided by  self    Provides Primary Care For  no one    Family Caregiver if Needed  sibling(s)    Able to Return to Prior Arrangements  yes       Transition Planning    Patient/Family Anticipates Transition to  home    Transportation Anticipated  family or friend will provide       Discharge Needs Assessment    Equipment Currently Used at Home  walker, rolling;crutches;cane, straight        Discharge Plan     Row Name 12/12/19 1108       Plan    Plan  Home w/ HH    Provided post acute provider list?  Yes    Post Acute Provider Lists  Home Health    Post Acuite Provider List  Delivered    Delivered To  Patient    Method of Delivery  In person    Patient/Family in Agreement with Plan  yes    Plan Comments  Spoke w/ patient at bedside. She lives alone in a two story, no bed or bath on first floor, in [facility]. She will be staying w/ her sister, also in [facility], through the weekend. Her sister's home is a one story. PTA patient was independent with ADL's and mobility. She has access to a RW at NH. A referral has been made to Tabitha norris/ [place]  for PT. Tabitha is aware that patient will temporarily be staying at her sister's until Monday. No other needs noted. CM following.     Final Discharge Disposition Code  06 - home with home health care        Destination      Coordination has not been started for this encounter.      Durable Medical Equipment      Coordination has not been started for this encounter.      Dialysis/Infusion      Coordination has not been started for this encounter.      Home Medical Care - Selection Complete      Service Provider Request Status Selected Services Address  Phone Number Fax Number    [facility] [address] , [facility] [address] [phone]      Therapy      Coordination has not been started for this encounter.      Community Resources      Coordination has not been started for this encounter.        Expected Discharge Date and Time     Expected Discharge Date Expected Discharge Time    Dec 12, 2019         Demographic Summary     Row Name 12/12/19 1059       General Information    Arrived From  home    Reason for Consult  discharge planning        Functional Status     Row Name 12/12/19 1059       Functional Status    Usual Activity Tolerance  good    Current Activity Tolerance  moderate        Psychosocial    No documentation.       Abuse/Neglect    No documentation.       Legal    No documentation.       Substance Abuse    No documentation.       Patient Forms    No documentation.           Kirsten Thornton RN

## 2019-12-12 NOTE — PLAN OF CARE
Problem: Patient Care Overview  Goal: Plan of Care Review  Flowsheets (Taken 12/12/2019 0905)  Progress: improving  Plan of Care Reviewed With: patient  Outcome Summary: Pt ambulated 350 feet with front-wheeled walker and CGA x1. Pt ascended/descended 5 steps with handrails on both sides and CGA x1. Gait/stair training limited by fatigue. Bed mobility, STS, and toilet transfers requiring supervision. Reviewed HEP and hip precautions. Anticipate pt d/c home with assist today and HHPT.

## 2019-12-13 LAB
ABO + RH BLD: NORMAL
ABO + RH BLD: NORMAL
BH BB BLOOD EXPIRATION DATE: NORMAL
BH BB BLOOD EXPIRATION DATE: NORMAL
BH BB BLOOD TYPE BARCODE: 5100
BH BB BLOOD TYPE BARCODE: 5100
BH BB DISPENSE STATUS: NORMAL
BH BB DISPENSE STATUS: NORMAL
BH BB PRODUCT CODE: NORMAL
BH BB PRODUCT CODE: NORMAL
BH BB UNIT NUMBER: NORMAL
BH BB UNIT NUMBER: NORMAL
UNIT  ABO: NORMAL
UNIT  ABO: NORMAL
UNIT  RH: NORMAL
UNIT  RH: NORMAL

## 2020-12-29 ENCOUNTER — APPOINTMENT (OUTPATIENT)
Dept: PREADMISSION TESTING | Facility: HOSPITAL | Age: 60
End: 2020-12-29

## 2020-12-29 PROCEDURE — U0004 COV-19 TEST NON-CDC HGH THRU: HCPCS

## 2020-12-29 PROCEDURE — C9803 HOPD COVID-19 SPEC COLLECT: HCPCS

## 2020-12-30 LAB — SARS-COV-2 RNA RESP QL NAA+PROBE: NOT DETECTED

## 2021-01-24 ENCOUNTER — APPOINTMENT (OUTPATIENT)
Dept: PREADMISSION TESTING | Facility: HOSPITAL | Age: 61
End: 2021-01-24

## 2022-03-04 ENCOUNTER — TRANSCRIBE ORDERS (OUTPATIENT)
Dept: ADMINISTRATIVE | Facility: HOSPITAL | Age: 62
End: 2022-03-04

## 2022-03-04 DIAGNOSIS — Z11.59 ENCOUNTER FOR SCREENING FOR VIRAL DISEASE: Primary | ICD-10-CM

## 2022-04-04 ENCOUNTER — APPOINTMENT (OUTPATIENT)
Dept: PREADMISSION TESTING | Facility: HOSPITAL | Age: 62
End: 2022-04-04

## 2022-04-06 ENCOUNTER — TRANSCRIBE ORDERS (OUTPATIENT)
Dept: ADMINISTRATIVE | Facility: HOSPITAL | Age: 62
End: 2022-04-06

## 2022-04-06 DIAGNOSIS — Z11.59 SPECIAL SCREENING EXAMINATION FOR VIRAL DISEASE: Primary | ICD-10-CM

## 2022-05-16 ENCOUNTER — LAB (OUTPATIENT)
Dept: PREADMISSION TESTING | Facility: HOSPITAL | Age: 62
End: 2022-05-16

## 2022-05-16 DIAGNOSIS — Z11.59 SPECIAL SCREENING EXAMINATION FOR VIRAL DISEASE: ICD-10-CM

## 2022-05-16 LAB — SARS-COV-2 RNA PNL SPEC NAA+PROBE: NOT DETECTED

## 2022-05-16 PROCEDURE — C9803 HOPD COVID-19 SPEC COLLECT: HCPCS

## 2022-05-16 PROCEDURE — U0004 COV-19 TEST NON-CDC HGH THRU: HCPCS

## 2022-05-18 ENCOUNTER — LAB REQUISITION (OUTPATIENT)
Dept: LAB | Facility: HOSPITAL | Age: 62
End: 2022-05-18

## 2022-05-18 DIAGNOSIS — N95.0 POSTMENOPAUSAL BLEEDING: ICD-10-CM

## 2022-05-18 PROCEDURE — 88305 TISSUE EXAM BY PATHOLOGIST: CPT | Performed by: OBSTETRICS & GYNECOLOGY

## 2022-05-19 LAB
CYTO UR: NORMAL
LAB AP CASE REPORT: NORMAL
LAB AP CLINICAL INFORMATION: NORMAL
PATH REPORT.FINAL DX SPEC: NORMAL
PATH REPORT.GROSS SPEC: NORMAL

## 2022-11-01 ENCOUNTER — APPOINTMENT (OUTPATIENT)
Dept: PREADMISSION TESTING | Facility: HOSPITAL | Age: 62
End: 2022-11-01

## 2023-05-12 ENCOUNTER — TRANSCRIBE ORDERS (OUTPATIENT)
Dept: LAB | Facility: HOSPITAL | Age: 63
End: 2023-05-12
Payer: COMMERCIAL

## 2023-05-12 ENCOUNTER — LAB (OUTPATIENT)
Dept: LAB | Facility: HOSPITAL | Age: 63
End: 2023-05-12
Payer: COMMERCIAL

## 2023-05-12 DIAGNOSIS — Z01.419 ROUTINE GYNECOLOGICAL EXAMINATION: ICD-10-CM

## 2023-05-12 DIAGNOSIS — Z01.419 ROUTINE GYNECOLOGICAL EXAMINATION: Primary | ICD-10-CM

## 2023-05-12 LAB — TSH SERPL DL<=0.05 MIU/L-ACNC: 1.24 UIU/ML (ref 0.27–4.2)

## 2023-05-12 PROCEDURE — 84443 ASSAY THYROID STIM HORMONE: CPT

## 2023-05-12 PROCEDURE — 36415 COLL VENOUS BLD VENIPUNCTURE: CPT

## 2025-03-21 ENCOUNTER — OFFICE VISIT (OUTPATIENT)
Dept: PULMONOLOGY | Facility: CLINIC | Age: 65
End: 2025-03-21
Payer: MEDICARE

## 2025-03-21 VITALS
HEART RATE: 69 BPM | SYSTOLIC BLOOD PRESSURE: 118 MMHG | DIASTOLIC BLOOD PRESSURE: 68 MMHG | BODY MASS INDEX: 27.28 KG/M2 | WEIGHT: 180 LBS | OXYGEN SATURATION: 99 % | HEIGHT: 68 IN

## 2025-03-21 DIAGNOSIS — Z87.891 PERSONAL HISTORY OF SMOKING: ICD-10-CM

## 2025-03-21 DIAGNOSIS — R91.1 LUNG NODULE: Primary | ICD-10-CM

## 2025-03-21 RX ORDER — ATORVASTATIN CALCIUM 20 MG/1
20 TABLET, FILM COATED ORAL
COMMUNITY
Start: 2025-02-06

## 2025-03-21 RX ORDER — ESTRADIOL 1 MG/1
1 TABLET ORAL DAILY
COMMUNITY

## 2025-03-21 RX ORDER — CHLORCYCLIZINE HYDROCHLORIDE AND PSEUDOEPHEDRINE HYDROCHLORIDE 25; 60 MG/1; MG/1
1 TABLET ORAL 2 TIMES DAILY PRN
COMMUNITY

## 2025-03-21 RX ORDER — MEDROXYPROGESTERONE ACETATE 5 MG
1 TABLET ORAL DAILY
COMMUNITY

## 2025-03-21 NOTE — PROGRESS NOTES
New Patient Pulmonary Office Visit      Patient Name: Yasmin Moreno    Referring Physician: Elisa Hernandez AP*    Chief Complaint:    Chief Complaint   Patient presents with    Lung Nodule       History of Present Illness: Yasmin Moreno is a 64 y.o. female who is here today to establish care with Pulmonary.  Patient has a past medical history significant for tobacco use.  He was referred to pulmonary for evaluation of a pulmonary nodule.  Patient states she was having her lung cancer screening CT scan they noted that she had a nodule denies any chest pain, nausea, fever, or chills.    Review of Systems:   Review of Systems   Constitutional:  Negative for chills, fatigue and fever.   HENT:  Negative for congestion and voice change.    Eyes:  Negative for blurred vision.   Respiratory:  Negative for cough, shortness of breath and wheezing.    Cardiovascular:  Negative for chest pain.   Skin:  Negative for dry skin.   Hematological:  Negative for adenopathy.   Psychiatric/Behavioral:  Negative for agitation and depressed mood.        Past Medical History:   Past Medical History:   Diagnosis Date    Chronic night sweats     GERD (gastroesophageal reflux disease)     Lung nodule 2/2025       Past Surgical History:   Past Surgical History:   Procedure Laterality Date    COLONOSCOPY      ROTATOR CUFF REPAIR Right     TOTAL HIP ARTHROPLASTY Right 12/11/2019    Procedure: TOTAL HIP ARTHROPLASTY ANTERIOR RIGHT;  Surgeon: John Barrios MD;  Location: [facility];  Service: Orthopedics    TUBAL ABDOMINAL LIGATION         Family History:   Family History   Problem Relation Age of Onset    Cancer Mother        Social History:   Social History     Socioeconomic History    Marital status:    Tobacco Use    Smoking status: Every Day     Current packs/day: 0.00     Average packs/day: 1 pack/day for 26.2 years (26.2 ttl pk-yrs)     Types: Cigarettes     Start date: 1979     Last attempt to quit: 3/8/2025      "Years since quittin.0    Smokeless tobacco: Never    Tobacco comments:     off and on for 10 years    Vaping Use    Vaping status: Never Used   Substance and Sexual Activity    Alcohol use: Not Currently    Drug use: No    Sexual activity: Not Currently       Medications:     Current Outpatient Medications:     atorvastatin (LIPITOR) 20 MG tablet, Take 1 tablet by mouth every night at bedtime., Disp: , Rfl:     escitalopram (LEXAPRO) 10 MG tablet, Take 1 tablet by mouth Every Morning., Disp: , Rfl:     estradiol (ESTRACE) 1 MG tablet, Take 1 tablet by mouth Daily., Disp: , Rfl:     medroxyPROGESTERone (PROVERA) 5 MG tablet, Take 1 tablet by mouth Daily., Disp: , Rfl:     Stahist AD 25-60 MG tablet, Take 1 tablet by mouth 2 (Two) Times a Day As Needed., Disp: , Rfl:     traZODone (DESYREL) 100 MG tablet, Take 1 tablet by mouth Every Night., Disp: , Rfl:     Allergies:   No Known Allergies    Physical Exam:  Vital Signs:   Vitals:    25 1020   BP: 118/68   Pulse: 69   SpO2: 99%   Weight: 81.6 kg (180 lb)   Height: 172.7 cm (68\")       Physical Exam  Vitals and nursing note reviewed.   Constitutional:       General: She is not in acute distress.     Appearance: She is well-developed and normal weight. She is not ill-appearing or toxic-appearing.   HENT:      Head: Normocephalic and atraumatic.   Cardiovascular:      Rate and Rhythm: Normal rate and regular rhythm.      Pulses: Normal pulses.      Heart sounds: Normal heart sounds. No murmur heard.     No friction rub. No gallop.   Pulmonary:      Effort: Pulmonary effort is normal. No respiratory distress.      Breath sounds: Normal breath sounds. No wheezing, rhonchi or rales.   Musculoskeletal:      Right lower leg: No edema.      Left lower leg: No edema.   Skin:     General: Skin is warm and dry.   Neurological:      Mental Status: She is alert and oriented to person, place, and time.         Immunization History   Administered Date(s) Administered    " COVID-19 (MODERNA) Monovalent Original Booster 04/18/2022    COVID-19 (PFIZER) BIVALENT 12+YRS 11/16/2022    COVID-19 (PFIZER) Purple Cap Monovalent 03/05/2021, 03/27/2021       Results Review:   - I personally reviewed the pts imaging from CT scan of the chest from 2/26/2025 shows no concerning nodule, mass or infiltrates, there is a pleural-based nodularity in the left lower lobe, suspected just atelectasis, no comparisons are available.  It does measure up to 8 mm.  - I personally reviewed the pts chart with regards to evaluation by the patient's PCP    Assessment / Plan:   Diagnoses and all orders for this visit:    1. Left lower lobe 8 mm noncalcified nodule (Primary)  -The appearance of the lesion looks more benign, plan for repeat CT in 6 months from the original.  And then we will go back to lung RADS.  Patient verbalized understanding agree with the plan.    2.  Personal history of smoking  - In this visit the patient was advised to stop smoking and was offered tobacco cessation measures and resources, including NRT and/or medication intervention.  I specifically discussed with the patient psychological components to quitting smoking which includes determining why the patient wants to quit smoking, identifying smoking triggers, and figuring out ways to replace those triggers.  Greater than 10 minutes was spent on face-to-face counseling regarding smoking cessation.  Plan to follow-up at the next visit on status cessation success.  Patient was willing to quit at this time.  Patient is using nicotine gum.  Goal is to have complete tobacco cessation within 1 year.    Follow Up:   Return in about 5 months (around 8/21/2025) for CT Chest with next visit.     MARKOS Yu, DO  Pulmonary and Critical Care Medicine  Note Electronically Signed    Part of this note may be an electronic transcription/translation of spoken language to printed text using the Dragon Dictation System.

## 2025-08-22 ENCOUNTER — OFFICE VISIT (OUTPATIENT)
Age: 65
End: 2025-08-22
Payer: MEDICARE

## 2025-08-22 VITALS
TEMPERATURE: 97.7 F | DIASTOLIC BLOOD PRESSURE: 72 MMHG | WEIGHT: 185 LBS | HEART RATE: 60 BPM | HEIGHT: 68 IN | BODY MASS INDEX: 28.04 KG/M2 | SYSTOLIC BLOOD PRESSURE: 110 MMHG | OXYGEN SATURATION: 97 %

## 2025-08-22 DIAGNOSIS — R91.1 PULMONARY NODULE: ICD-10-CM

## 2025-08-22 DIAGNOSIS — F17.210 CIGARETTE NICOTINE DEPENDENCE WITHOUT COMPLICATION: Primary | ICD-10-CM

## 2025-08-22 PROCEDURE — 1159F MED LIST DOCD IN RCRD: CPT | Performed by: NURSE PRACTITIONER

## 2025-08-22 PROCEDURE — 99214 OFFICE O/P EST MOD 30 MIN: CPT | Performed by: NURSE PRACTITIONER

## 2025-08-22 PROCEDURE — 1160F RVW MEDS BY RX/DR IN RCRD: CPT | Performed by: NURSE PRACTITIONER

## 2025-08-22 RX ORDER — IPRATROPIUM BROMIDE 42 UG/1
SPRAY, METERED NASAL
COMMUNITY
Start: 2025-08-20

## 2025-08-29 ENCOUNTER — HOSPITAL ENCOUNTER (OUTPATIENT)
Dept: CT IMAGING | Facility: HOSPITAL | Age: 65
Discharge: HOME | End: 2025-08-29
Payer: MEDICARE

## 2025-08-29 DIAGNOSIS — R91.1 LUNG NODULE: ICD-10-CM

## 2025-08-29 PROCEDURE — 71250 CT THORAX DX C-: CPT

## (undated) DEVICE — ANTIBACTERIAL UNDYED BRAIDED (POLYGLACTIN 910), SYNTHETIC ABSORBABLE SUTURE: Brand: COATED VICRYL

## (undated) DEVICE — PREP SOL POVIDONE/IODINE BT 4OZ

## (undated) DEVICE — GLV SURG PREMIERPRO MIC LTX PF SZ8.5 BRN

## (undated) DEVICE — NDL FLTR BLNT 18G 1 1/2IN

## (undated) DEVICE — COVER,LIGHT HANDLE,FLX,1/PK: Brand: MEDLINE INDUSTRIES, INC.

## (undated) DEVICE — 1 ML TUBERCULIN SYRINGE REGULAR TIP: Brand: MONOJECT

## (undated) DEVICE — PK MAJ TOTL HIP ANT 10

## (undated) DEVICE — NDL SPINE 18G 31/2IN PNK

## (undated) DEVICE — 3M™ DURAPORE™ SURGICAL TAPE 1538-3, 3 INCH X 10 YARD (7,5CM X 9,1M), 4 ROLLS/BOX: Brand: 3M™ DURAPORE™

## (undated) DEVICE — BNDG ELAS CO-FLEX SLF ADHR 4IN5YD LF STRL

## (undated) DEVICE — SYR LUERLOK 50ML

## (undated) DEVICE — GLV SURG SIGNATURE TOUCH PF LTX 8 STRL BX/50

## (undated) DEVICE — TRY EPID SFTY 18G 3.5IN 1T7680